# Patient Record
Sex: FEMALE | Employment: OTHER | URBAN - METROPOLITAN AREA
[De-identification: names, ages, dates, MRNs, and addresses within clinical notes are randomized per-mention and may not be internally consistent; named-entity substitution may affect disease eponyms.]

---

## 2017-03-15 ENCOUNTER — OFFICE VISIT (OUTPATIENT)
Dept: NEPHROLOGY | Facility: MEDICAL CENTER | Age: 72
End: 2017-03-15
Payer: MEDICARE

## 2017-03-15 VITALS
BODY MASS INDEX: 24.24 KG/M2 | HEIGHT: 64 IN | SYSTOLIC BLOOD PRESSURE: 125 MMHG | WEIGHT: 142 LBS | DIASTOLIC BLOOD PRESSURE: 55 MMHG

## 2017-03-15 DIAGNOSIS — N18.9 CKD (CHRONIC KIDNEY DISEASE), UNSPECIFIED STAGE: ICD-10-CM

## 2017-03-15 DIAGNOSIS — M54.9 BACK PAIN, UNSPECIFIED BACK LOCATION, UNSPECIFIED BACK PAIN LATERALITY, UNSPECIFIED CHRONICITY: ICD-10-CM

## 2017-03-15 PROCEDURE — 99214 OFFICE O/P EST MOD 30 MIN: CPT | Performed by: INTERNAL MEDICINE

## 2017-03-15 PROCEDURE — 3017F COLORECTAL CA SCREEN DOC REV: CPT | Performed by: INTERNAL MEDICINE

## 2017-03-15 PROCEDURE — 4040F PNEUMOC VAC/ADMIN/RCVD: CPT | Mod: 8P | Performed by: INTERNAL MEDICINE

## 2017-03-15 PROCEDURE — G8484 FLU IMMUNIZE NO ADMIN: HCPCS | Performed by: INTERNAL MEDICINE

## 2017-03-15 PROCEDURE — 3014F SCREEN MAMMO DOC REV: CPT | Performed by: INTERNAL MEDICINE

## 2017-03-15 PROCEDURE — 1036F TOBACCO NON-USER: CPT | Performed by: INTERNAL MEDICINE

## 2017-03-15 PROCEDURE — 1101F PT FALLS ASSESS-DOCD LE1/YR: CPT | Performed by: INTERNAL MEDICINE

## 2017-03-15 PROCEDURE — G8420 CALC BMI NORM PARAMETERS: HCPCS | Performed by: INTERNAL MEDICINE

## 2017-03-15 PROCEDURE — G8432 DEP SCR NOT DOC, RNG: HCPCS | Performed by: INTERNAL MEDICINE

## 2017-03-15 ASSESSMENT — ENCOUNTER SYMPTOMS
NAUSEA: 0
CHILLS: 0
BACK PAIN: 1
FEVER: 0
VISUAL CHANGE: 0
VOMITING: 0

## 2017-03-15 NOTE — MR AVS SNAPSHOT
"        Aura Ospina   3/15/2017 2:45 PM   Office Visit   MRN: [id number]    Department:  [facility]   Dept Phone:  [phone]    Description:  Female : 1945   Provider:  Fadi Najjar, M.D.           Reason for Visit     Follow-Up           Allergies as of 3/15/2017     Allergen Noted Reactions    Penicillins 10/15/2015   Hives    Food 2016       Dairy Products    Sulfa Drugs 2016         You were diagnosed with     CKD (chronic kidney disease), unspecified stage   [[id number]]         Vital Signs     Blood Pressure Height Weight Body Mass Index Smoking Status       125/55 mmHg 1.626 m (5' 4\") 64.411 kg (142 lb) 24.36 kg/m2 Former Smoker       Basic Information     Date Of Birth Sex Race Ethnicity Preferred Language    1945 Female Unknown Unknown English      Problem List              ICD-10-CM Priority Class Noted - Resolved    Back pain M54.9   Unknown - Present    HLD (hyperlipidemia) E78.5   10/15/2015 - Present    GERD without esophagitis K21.9   10/15/2015 - Present    CKD (chronic kidney disease) N18.9   2016 - Present      Health Maintenance        Date Due Completion Dates    IMM DTaP/Tdap/Td Vaccine (1 - Tdap) 1963 ---    PAP SMEAR 1965 ---    IMM ZOSTER VACCINE 2004 ---    IMM PNEUMOCOCCAL 65+ (ADULT) LOW/MEDIUM RISK SERIES (1 of 2 - PCV13) 2009 ---    IMM INFLUENZA (1) 2016 10/15/2015    MAMMOGRAM 2017, 2016, 2016, 2016, 2015    BONE DENSITY 10/26/2020 10/26/2015, 2009 (N/S)    Override on 2009: (N/S)    COLONOSCOPY 10/18/2022 10/18/2012 (Done)    Override on 10/18/2012: Done            Current Immunizations     Influenza Vaccine Adult HD  Incomplete, 10/15/2015      Below and/or attached are the medications your provider expects you to take. Review all of your home medications and newly ordered medications with your provider and/or pharmacist. Follow medication instructions as directed by your provider " and/or pharmacist. Please keep your medication list with you and share with your provider. Update the information when medications are discontinued, doses are changed, or new medications (including over-the-counter products) are added; and carry medication information at all times in the event of emergency situations     Allergies:  PENICILLINS - Hives     FOOD - (reactions not documented)     SULFA DRUGS - (reactions not documented)               Medications  Valid as of: March 15, 2017 -  2:59 PM    Generic Name Brand Name Tablet Size Instructions for use    Acetaminophen   Take  by mouth.        Ascorbic Acid (Tab) ascorbic acid 500 MG Take 500 mg by mouth every day.        Aspirin (Chew Tab) ASA 81 MG Take 81 mg by mouth every day.        Cholecalciferol   Take  by mouth.        Ginkgo Biloba (Tab) Ginkgo Biloba 40 MG Take  by mouth.        Ibuprofen   Take  by mouth.        Iron Combinations (Tab) Iron  Take  by mouth.        Lecithin   Take  by mouth.        Multiple Vitamin (Tab) THERAGRAN  Take 1 Tab by mouth every day.        Omega-3 Fatty Acids (Cap) fish oil 1000 MG Take 1,000 mg by mouth 3 times a day, with meals.        RABEprazole Sodium (Tablet Delayed Response) ACIPHEX 20 MG Take 1 Tab by mouth every day.        Simvastatin (Tab) ZOCOR 20 MG Take 1 Tab by mouth every evening.        .                 Medicines prescribed today were sent to:     [facility] [address] [place] [address] AT [facility]    [address] [place] [id number]    Phone: [phone] Fax: [phone]    Open 24 Hours?: No      Medication refill instructions:       If your prescription bottle indicates you have medication refills left, it is not necessary to call your provider’s office. Please contact your pharmacy and they will refill your medication.    If your prescription bottle indicates you do not have any refills left, you may request refills at any time through one of the  following ways: The online [employer] system (except Urgent Care), by calling your provider’s office, or by asking your pharmacy to contact your provider’s office with a refill request. Medication refills are processed only during regular business hours and may not be available until the next business day. Your provider may request additional information or to have a follow-up visit with you prior to refilling your medication.   *Please Note: Medication refills are assigned a new Rx number when refilled electronically. Your pharmacy may indicate that no refills were authorized even though a new prescription for the same medication is available at the pharmacy. Please request the medicine by name with the pharmacy before contacting your provider for a refill.        Your To Do List     Future Labs/Procedures Complete By Expires    BASIC METABOLIC PANEL  As directed 3/15/2018    CBC WITH DIFFERENTIAL  As directed 3/15/2018         [employer] Access Code: [email or web address]  Expires: 4/14/2017  2:59 PM    [employer]  A secure, online tool to manage your health information     [employer]’s [employer]® is a secure, online tool that connects you to your personalized health information from the privacy of your home -- day or night - making it very easy for you to manage your healthcare. Once the activation process is completed, you can even access your medical information using the [employer] radha, which is available for free in the Apple Radha store or Google Play store.     [employer] provides the following levels of access (as shown below):   My Chart Features   [facility] Doctor [facility]  Urgent  Care Non-[facility]  Primary Care  Doctor   Email your healthcare team securely and privately 24/7 X X X    Manage appointments: schedule your next appointment; view details of past/upcoming appointments X      Request prescription refills. X      View recent personal medical records, including lab and immunizations X X X  X   View health record, including health history, allergies, medications X X X X   Read reports about your outpatient visits, procedures, consult and ER notes X X X X   See your discharge summary, which is a recap of your hospital and/or ER visit that includes your diagnosis, lab results, and care plan. X X       How to register for [employer]:  1. Go to  [email or web address].  2. Click on the Sign Up Now box, which takes you to the New Member Sign Up page. You will need to provide the following information:  a. Enter your [employer] Access Code exactly as it appears at the top of this page. (You will not need to use this code after you’ve completed the sign-up process. If you do not sign up before the expiration date, you must request a new code.)   b. Enter your date of birth.   c. Enter your home email address.   d. Click Submit, and follow the next screen’s instructions.  3. Create a [employer] ID. This will be your [employer] login ID and cannot be changed, so think of one that is secure and easy to remember.  4. Create a [employer] password. You can change your password at any time.  5. Enter your Password Reset Question and Answer. This can be used at a later time if you forget your password.   6. Enter your e-mail address. This allows you to receive e-mail notifications when new information is available in [employer].  7. Click Sign Up. You can now view your health information.    For assistance activating your [employer] account, call [phone]

## 2017-03-15 NOTE — PROGRESS NOTES
"Subjective:      Aura Ospina is a 71 y.o. female who presents with Chronic Kidney Disease    The patient has chronic back pain and she was using NSAIDs for it, she decreased her use of NSAIDs for the last 6 months, since she's been using it on a once a week basis.        Chronic Kidney Disease  This is a chronic problem. The current episode started more than 1 year ago. The problem occurs constantly. The problem has been unchanged. Pertinent negatives include no chills, fever, nausea, urinary symptoms, visual change or vomiting. Exacerbated by: NSAIDs.       Review of Systems   Constitutional: Negative for fever and chills.   Cardiovascular: Negative for leg swelling.   Gastrointestinal: Negative for nausea and vomiting.   Genitourinary: Negative for dysuria, urgency and frequency.   Musculoskeletal: Positive for back pain.   All other systems reviewed and are negative.         Objective:     /55 mmHg  Ht 1.626 m (5' 4\")  Wt 64.411 kg (142 lb)  BMI 24.36 kg/m2     Physical Exam   Constitutional: She is oriented to person, place, and time. She appears well-developed and well-nourished. No distress.   HENT:   Head: Normocephalic and atraumatic.   Nose: Nose normal.   Eyes: Right eye exhibits no discharge. Left eye exhibits no discharge. No scleral icterus.   Cardiovascular: Normal rate and regular rhythm.    Pulmonary/Chest: Effort normal.   Musculoskeletal: She exhibits no edema.   Neurological: She is alert and oriented to person, place, and time.   Skin: Skin is warm and dry. She is not diaphoretic. No erythema.   Psychiatric: She has a normal mood and affect.   Nursing note and vitals reviewed.              Assessment/Plan:     1. CKD (chronic kidney disease), unspecified stage  Continue to avoid NSAIDs if possible   Check labs  Renal dose all medication  Avoid nephrotoxins        "

## 2017-03-16 ENCOUNTER — HOSPITAL ENCOUNTER (OUTPATIENT)
Dept: LAB | Facility: MEDICAL CENTER | Age: 72
End: 2017-03-16
Attending: FAMILY MEDICINE
Payer: MEDICARE

## 2017-03-16 DIAGNOSIS — N18.9 CKD (CHRONIC KIDNEY DISEASE), UNSPECIFIED STAGE: ICD-10-CM

## 2017-03-16 LAB
ANION GAP SERPL CALC-SCNC: 10 MMOL/L (ref 0–11.9)
BASOPHILS # BLD AUTO: 0.07 K/UL (ref 0–0.12)
BASOPHILS NFR BLD AUTO: 0.8 % (ref 0–1.8)
BUN SERPL-MCNC: 20 MG/DL (ref 8–22)
CALCIUM SERPL-MCNC: 10 MG/DL (ref 8.5–10.5)
CHLORIDE SERPL-SCNC: 103 MMOL/L (ref 96–112)
CO2 SERPL-SCNC: 29 MMOL/L (ref 20–33)
CREAT SERPL-MCNC: 0.93 MG/DL (ref 0.5–1.4)
CREAT UR-MCNC: 116.5 MG/DL
EOSINOPHIL # BLD: 0.36 K/UL (ref 0–0.51)
EOSINOPHIL NFR BLD AUTO: 4.3 % (ref 0–6.9)
ERYTHROCYTE [DISTWIDTH] IN BLOOD BY AUTOMATED COUNT: 44.7 FL (ref 35.9–50)
GLUCOSE SERPL-MCNC: 71 MG/DL (ref 65–99)
HCT VFR BLD AUTO: 42 % (ref 37–47)
HGB BLD-MCNC: 13.3 G/DL (ref 12–16)
IMM GRANULOCYTES # BLD AUTO: 0.04 K/UL (ref 0–0.11)
IMM GRANULOCYTES NFR BLD AUTO: 0.5 % (ref 0–0.9)
LYMPHOCYTES # BLD: 2.42 K/UL (ref 1–4.8)
LYMPHOCYTES NFR BLD AUTO: 28.6 % (ref 22–41)
MCH RBC QN AUTO: 30.9 PG (ref 27–33)
MCHC RBC AUTO-ENTMCNC: 31.7 G/DL (ref 33.6–35)
MCV RBC AUTO: 97.4 FL (ref 81.4–97.8)
MICROALBUMIN UR-MCNC: <0.7 MG/DL
MICROALBUMIN/CREAT UR: NORMAL MG/G (ref 0–30)
MONOCYTES # BLD: 0.5 K/UL (ref 0–0.85)
MONOCYTES NFR BLD AUTO: 5.9 % (ref 0–13.4)
NEUTROPHILS # BLD: 5.07 K/UL (ref 2–7.15)
NEUTROPHILS NFR BLD AUTO: 59.9 % (ref 44–72)
NRBC # BLD AUTO: 0.02 K/UL
NRBC BLD-RTO: 0.2 /100 WBC
PLATELET # BLD AUTO: 218 K/UL (ref 164–446)
PMV BLD AUTO: 12.1 FL (ref 9–12.9)
POTASSIUM SERPL-SCNC: 3.8 MMOL/L (ref 3.6–5.5)
RBC # BLD AUTO: 4.31 M/UL (ref 4.2–5.4)
SODIUM SERPL-SCNC: 142 MMOL/L (ref 135–145)
WBC # BLD AUTO: 8.5 K/UL (ref 4.8–10.8)

## 2017-03-16 PROCEDURE — 82043 UR ALBUMIN QUANTITATIVE: CPT

## 2017-03-16 PROCEDURE — 80048 BASIC METABOLIC PNL TOTAL CA: CPT

## 2017-03-16 PROCEDURE — 36415 COLL VENOUS BLD VENIPUNCTURE: CPT

## 2017-03-16 PROCEDURE — 82570 ASSAY OF URINE CREATININE: CPT

## 2017-03-16 PROCEDURE — 85025 COMPLETE CBC W/AUTO DIFF WBC: CPT

## 2017-06-30 NOTE — TELEPHONE ENCOUNTER
Was the patient seen in the last year in this department? Yes     Does patient have an active prescription for medications requested? Yes     Received Request Via: Pharmacy

## 2017-07-05 DIAGNOSIS — K21.9 GERD WITHOUT ESOPHAGITIS: ICD-10-CM

## 2017-07-05 RX ORDER — RABEPRAZOLE SODIUM 20 MG/1
20 TABLET, DELAYED RELEASE ORAL
Qty: 30 TAB | Refills: 2 | Status: SHIPPED | OUTPATIENT
Start: 2017-07-05 | End: 2017-08-02 | Stop reason: SDUPTHER

## 2017-07-05 RX ORDER — RABEPRAZOLE SODIUM 20 MG/1
TABLET, DELAYED RELEASE ORAL
Qty: 30 TAB | Refills: 0 | Status: SHIPPED | OUTPATIENT
Start: 2017-07-05 | End: 2017-07-05 | Stop reason: SDUPTHER

## 2017-07-05 RX ORDER — SIMVASTATIN 20 MG
20 TABLET ORAL EVERY EVENING
Qty: 30 TAB | Refills: 9 | Status: SHIPPED | OUTPATIENT
Start: 2017-07-05 | End: 2018-04-30 | Stop reason: SDUPTHER

## 2017-07-05 NOTE — TELEPHONE ENCOUNTER
Was the patient seen in the last year in this department? Yes     Does patient have an active prescription for medications requested? No     Received Request Via: Patient   Last seen 9/6/16  Last labs 3/16/17

## 2017-08-02 DIAGNOSIS — E78.2 MIXED HYPERLIPIDEMIA: ICD-10-CM

## 2017-08-02 RX ORDER — RABEPRAZOLE SODIUM 20 MG/1
20 TABLET, DELAYED RELEASE ORAL
Qty: 30 TAB | Refills: 0 | Status: SHIPPED | OUTPATIENT
Start: 2017-08-02 | End: 2017-09-12 | Stop reason: SDUPTHER

## 2017-08-02 NOTE — TELEPHONE ENCOUNTER
Was the patient seen in the last year in this department? Yes     Does patient have an active prescription for medications requested? No     Received Request Via: Patient   Last seen 9/6/16  Last labs 3/16/17  Patient has appointment on 9/12/17

## 2017-09-05 ENCOUNTER — HOSPITAL ENCOUNTER (OUTPATIENT)
Dept: LAB | Facility: MEDICAL CENTER | Age: 72
End: 2017-09-05
Attending: FAMILY MEDICINE
Payer: MEDICARE

## 2017-09-05 ENCOUNTER — TELEPHONE (OUTPATIENT)
Dept: MEDICAL GROUP | Facility: PHYSICIAN GROUP | Age: 72
End: 2017-09-05

## 2017-09-05 DIAGNOSIS — E78.2 MIXED HYPERLIPIDEMIA: ICD-10-CM

## 2017-09-05 LAB
25(OH)D3 SERPL-MCNC: 72 NG/ML (ref 30–100)
ALBUMIN SERPL BCP-MCNC: 4.4 G/DL (ref 3.2–4.9)
ALBUMIN/GLOB SERPL: 1.6 G/DL
ALP SERPL-CCNC: 65 U/L (ref 30–99)
ALT SERPL-CCNC: 24 U/L (ref 2–50)
ANION GAP SERPL CALC-SCNC: 9 MMOL/L (ref 0–11.9)
AST SERPL-CCNC: 24 U/L (ref 12–45)
BILIRUB SERPL-MCNC: 0.6 MG/DL (ref 0.1–1.5)
BUN SERPL-MCNC: 17 MG/DL (ref 8–22)
CALCIUM SERPL-MCNC: 9.8 MG/DL (ref 8.5–10.5)
CHLORIDE SERPL-SCNC: 104 MMOL/L (ref 96–112)
CHOLEST SERPL-MCNC: 149 MG/DL (ref 100–199)
CO2 SERPL-SCNC: 26 MMOL/L (ref 20–33)
CREAT SERPL-MCNC: 0.9 MG/DL (ref 0.5–1.4)
ERYTHROCYTE [DISTWIDTH] IN BLOOD BY AUTOMATED COUNT: 44.9 FL (ref 35.9–50)
EST. AVERAGE GLUCOSE BLD GHB EST-MCNC: 131 MG/DL
GFR SERPL CREATININE-BSD FRML MDRD: >60 ML/MIN/1.73 M 2
GLOBULIN SER CALC-MCNC: 2.8 G/DL (ref 1.9–3.5)
GLUCOSE SERPL-MCNC: 66 MG/DL (ref 65–99)
HBA1C MFR BLD: 6.2 % (ref 0–5.6)
HCT VFR BLD AUTO: 41.6 % (ref 37–47)
HDLC SERPL-MCNC: 52 MG/DL
HGB BLD-MCNC: 13.1 G/DL (ref 12–16)
LDLC SERPL CALC-MCNC: 78 MG/DL
MCH RBC QN AUTO: 30.8 PG (ref 27–33)
MCHC RBC AUTO-ENTMCNC: 31.5 G/DL (ref 33.6–35)
MCV RBC AUTO: 97.9 FL (ref 81.4–97.8)
PLATELET # BLD AUTO: 203 K/UL (ref 164–446)
PMV BLD AUTO: 12.1 FL (ref 9–12.9)
POTASSIUM SERPL-SCNC: 3.8 MMOL/L (ref 3.6–5.5)
PROT SERPL-MCNC: 7.2 G/DL (ref 6–8.2)
RBC # BLD AUTO: 4.25 M/UL (ref 4.2–5.4)
SODIUM SERPL-SCNC: 139 MMOL/L (ref 135–145)
T3 SERPL-MCNC: 74.7 NG/DL (ref 60–181)
T4 FREE SERPL-MCNC: 0.78 NG/DL (ref 0.53–1.43)
TRIGL SERPL-MCNC: 96 MG/DL (ref 0–149)
TSH SERPL DL<=0.005 MIU/L-ACNC: 1.02 UIU/ML (ref 0.3–3.7)
WBC # BLD AUTO: 6.8 K/UL (ref 4.8–10.8)

## 2017-09-05 PROCEDURE — 84439 ASSAY OF FREE THYROXINE: CPT

## 2017-09-05 PROCEDURE — 80053 COMPREHEN METABOLIC PANEL: CPT

## 2017-09-05 PROCEDURE — 84443 ASSAY THYROID STIM HORMONE: CPT

## 2017-09-05 PROCEDURE — 36415 COLL VENOUS BLD VENIPUNCTURE: CPT

## 2017-09-05 PROCEDURE — 84480 ASSAY TRIIODOTHYRONINE (T3): CPT

## 2017-09-05 PROCEDURE — 83036 HEMOGLOBIN GLYCOSYLATED A1C: CPT

## 2017-09-05 PROCEDURE — 82306 VITAMIN D 25 HYDROXY: CPT

## 2017-09-05 PROCEDURE — 85027 COMPLETE CBC AUTOMATED: CPT

## 2017-09-05 PROCEDURE — 80061 LIPID PANEL: CPT

## 2017-09-12 ENCOUNTER — OFFICE VISIT (OUTPATIENT)
Dept: MEDICAL GROUP | Facility: PHYSICIAN GROUP | Age: 72
End: 2017-09-12
Payer: MEDICARE

## 2017-09-12 VITALS
RESPIRATION RATE: 16 BRPM | DIASTOLIC BLOOD PRESSURE: 62 MMHG | HEART RATE: 66 BPM | BODY MASS INDEX: 22.71 KG/M2 | OXYGEN SATURATION: 97 % | SYSTOLIC BLOOD PRESSURE: 132 MMHG | HEIGHT: 64 IN | WEIGHT: 133 LBS | TEMPERATURE: 98 F

## 2017-09-12 DIAGNOSIS — E78.2 MIXED HYPERLIPIDEMIA: ICD-10-CM

## 2017-09-12 DIAGNOSIS — Z12.31 ENCOUNTER FOR SCREENING MAMMOGRAM FOR HIGH-RISK PATIENT: ICD-10-CM

## 2017-09-12 DIAGNOSIS — Z23 NEED FOR PNEUMOCOCCAL VACCINATION: ICD-10-CM

## 2017-09-12 DIAGNOSIS — K21.9 GERD WITHOUT ESOPHAGITIS: ICD-10-CM

## 2017-09-12 DIAGNOSIS — Z23 NEED FOR TDAP VACCINATION: ICD-10-CM

## 2017-09-12 DIAGNOSIS — Z23 NEED FOR INFLUENZA VACCINATION: ICD-10-CM

## 2017-09-12 DIAGNOSIS — N18.2 CKD (CHRONIC KIDNEY DISEASE), STAGE 2 (MILD): ICD-10-CM

## 2017-09-12 DIAGNOSIS — Z00.00 MEDICARE ANNUAL WELLNESS VISIT, SUBSEQUENT: ICD-10-CM

## 2017-09-12 PROCEDURE — G0009 ADMIN PNEUMOCOCCAL VACCINE: HCPCS | Performed by: FAMILY MEDICINE

## 2017-09-12 PROCEDURE — 90670 PCV13 VACCINE IM: CPT | Performed by: FAMILY MEDICINE

## 2017-09-12 PROCEDURE — 90662 IIV NO PRSV INCREASED AG IM: CPT | Performed by: FAMILY MEDICINE

## 2017-09-12 PROCEDURE — G0439 PPPS, SUBSEQ VISIT: HCPCS | Mod: 25 | Performed by: FAMILY MEDICINE

## 2017-09-12 PROCEDURE — 90715 TDAP VACCINE 7 YRS/> IM: CPT | Performed by: FAMILY MEDICINE

## 2017-09-12 PROCEDURE — G0008 ADMIN INFLUENZA VIRUS VAC: HCPCS | Performed by: FAMILY MEDICINE

## 2017-09-12 PROCEDURE — 90472 IMMUNIZATION ADMIN EACH ADD: CPT | Performed by: FAMILY MEDICINE

## 2017-09-12 RX ORDER — RABEPRAZOLE SODIUM 20 MG/1
20 TABLET, DELAYED RELEASE ORAL
Qty: 30 TAB | Refills: 11 | Status: SHIPPED | OUTPATIENT
Start: 2017-09-12 | End: 2018-08-27 | Stop reason: SDUPTHER

## 2017-09-12 RX ORDER — RABEPRAZOLE SODIUM 20 MG/1
20 TABLET, DELAYED RELEASE ORAL
Qty: 30 TAB | Refills: 6 | Status: CANCELLED | OUTPATIENT
Start: 2017-09-12

## 2017-09-12 ASSESSMENT — PATIENT HEALTH QUESTIONNAIRE - PHQ9: CLINICAL INTERPRETATION OF PHQ2 SCORE: 0

## 2017-09-12 NOTE — PROGRESS NOTES
Chief Complaint   Patient presents with   • Gastrophageal Reflux         HPI:  Aura is a 72 y.o. here for Medicare Annual Wellness Visit        Patient Active Problem List    Diagnosis Date Noted   • CKD (chronic kidney disease) 09/20/2016   • HLD (hyperlipidemia) 10/15/2015   • GERD without esophagitis 10/15/2015   • Back pain        Current Outpatient Prescriptions   Medication Sig Dispense Refill   • rabeprazole (ACIPHEX) 20 MG tablet Take 1 Tab by mouth every day. 30 Tab 0   • simvastatin (ZOCOR) 20 MG Tab Take 1 Tab by mouth every evening. 30 Tab 9   • multivitamin (THERAGRAN) Tab Take 1 Tab by mouth every day.     • ascorbic acid (ASCORBIC ACID) 500 MG Tab Take 500 mg by mouth every day.     • Cholecalciferol (VITAMIN D-3 PO) Take  by mouth.     • Iron Tab Take  by mouth.     • Omega-3 Fatty Acids (FISH OIL) 1000 MG Cap capsule Take 1,000 mg by mouth 3 times a day, with meals.     • Ginkgo Biloba (GINKOBA) 40 MG Tab Take  by mouth.     • LECITHIN PO Take  by mouth.     • aspirin (ASA) 81 MG Chew Tab chewable tablet Take 81 mg by mouth every day.     • Acetaminophen (TYLENOL 8 HOUR PO) Take  by mouth.     • Ibuprofen (MOTRIN IB PO) Take  by mouth.     • rabeprazole (ACIPHEX) 20 MG tablet Take 1 Tab by mouth every day. (Patient not taking: Reported on 9/12/2017) 30 Tab 9     No current facility-administered medications for this visit.         The patient is not taking no medication(s) due to:  no prescription for the medication.  Current supplements as per medication list.     Allergies: Penicillins; Food; and Sulfa drugs    Current social contact/activities:  Bowling , golfing and traveling    Is patient current with immunizations? No, due for TDAP. Patient is interested in receiving PREVNAR (PCV13)  today.    She  reports that she has quit smoking. She has never used smokeless tobacco. She reports that she does not drink alcohol or use drugs.  Counseling given: Not Answered        DPA/Advanced directive:  Patient does not have an Advanced Directive.  A packet and workshop information was given on Advanced Directives.    ROS:    Gait: Uses no assistive device no  Ostomy: no no  Other tubes: no no  Amputations: no no  Chronic oxygen use no no  Last eye exam  9/1/16  Wears hearing aids: no   : Denies incontinence. no      Screening:        Depression Screening    Little interest or pleasure in doing things?  0 - not at all  Feeling down, depressed, or hopeless? 0 - not at all  Patient Health Questionnaire Score: 0    If depressive symptoms identified deferred to follow up visit unless specifically addressed in assessment and plan.    Interpretation of PHQ-9 Total Score   Score Severity   1-4 No Depression   5-9 Mild Depression   10-14 Moderate Depression   15-19 Moderately Severe Depression   20-27 Severe Depression    Screening for Cognitive Impairment    Three Minute Recall (apple, watch, ang)   3/3    Draw clock face with all 12 numbers set to the hand to show 10 minutes past 11 o'clock       If cognitive concerns identified, deferred for follow up unless specifically addressed in assessment and plan.    Fall Risk Assessment    Has the patient had two or more falls in the last year or any fall with injury in the last year?  No  If fall risk identified, deferred for follow up unless specifically addressed in assessment and plan.    Safety Assessment    Throw rugs on floor.   yes  Handrails on all stairs.   No stairs  Good lighting in all hallways.   yes  Difficulty hearing.   no  Patient counseled about all safety risks that were identified.    Functional Assessment ADLs    Are there any barriers preventing you from cooking for yourself or meeting nutritional needs?   .  no  Are there any barriers preventing you from driving safely or obtaining transportation?   .  no  Are there any barriers preventing you from using a telephone or calling for help?   .  no  Are there any barriers preventing you from shopping?   .  "no   Are there any barriers preventing you from taking care of your own finances?   .  no  Are there any barriers preventing you from managing your medications?   .  no  Are you currently engaging any exercise or physical activity?   .   yes    Health Maintenance Summary                IMM DTaP/Tdap/Td Vaccine Overdue 6/4/1963     PAP SMEAR Overdue 6/4/1965     IMM ZOSTER VACCINE Overdue 6/4/2004     IMM PNEUMOCOCCAL 65+ (ADULT) LOW/MEDIUM RISK SERIES Overdue 6/4/2009     MAMMOGRAM Overdue 12/18/2016      Done 12/18/2015 MA-SCREEN MAMMO W/CAD-BILAT    IMM INFLUENZA Overdue 9/1/2017      Done 10/15/2015 Imm Admin: Influenza Vaccine Adult HD    BONE DENSITY Next Due 10/26/2020      Done 10/26/2015 DS-BONE DENSITY STUDY (DEXA)     Patient has more history with this topic...    COLONOSCOPY Next Due 10/18/2022      Done 10/18/2012           Patient Care Team:  Slava Worthy M.D. as PCP - General (Family Medicine)    Social History   Substance Use Topics   • Smoking status: Former Smoker   • Smokeless tobacco: Never Used   • Alcohol use No     Family History   Problem Relation Age of Onset   • Lung Disease Mother    • Diabetes Mother    • Cancer Mother    • Heart Disease Father      She  has a past medical history of Back pain; GERD (gastroesophageal reflux disease); and Hyperlipidemia.   No past surgical history on file.        Exam:     Blood pressure 132/62, pulse 66, temperature 36.7 °C (98 °F), resp. rate 16, height 1.626 m (5' 4\"), weight 60.3 kg (133 lb), SpO2 97 %. Body mass index is 22.83 kg/m².    Hearing excellent.    Dentition good  Alert, oriented in no acute distress.  Eye contact is good, speech goal directed, affect calm  yes    Assessment and Plan. The following treatment and monitoring plan is recommended:    1. Medicare annual wellness visit, subsequent      2. Need for Tdap vaccination    - Tdap =>8yo IM    3. Need for influenza vaccination    - INFLUENZA VACCINE, HIGH DOSE (65+ ONLY)    4. Encounter " for screening mammogram for high-risk patient    - [place]-SCREEN MAMMO W/CAD-BILAT    5. CKD (chronic kidney disease), stage 2 (mild)  Patient is currently being followed for chronic renal insufficiency. They are avoiding nsaids and maintaining hydration and following renal diet. Taking all appropriate meds. No new change in urine frequency or volume.      6. Mixed hyperlipidemia  Currently treated for HLD, taking meds with no new myalgias or joint pain, watching fats in diet  controlled    At target    7. GERD without esophagitis  Patient is currently being treated for gerd, taking meds with no new symptoms or side effects, is trying to modify diet with decreased acidic foods, caffeine, etoh.  controlled        8. Need for pneumococcal vaccination    - pneumococcal 13-Vidya Conj Vacc (PREVNAR 13) syringe; 0.5 mL by Intramuscular route Once PRN for up to 1 dose.  Dispense: 0.5 Each; Refill: 0    Past Medical History:   Diagnosis Date   • Back pain     L4-5 disc that occasionally hurts   • GERD (gastroesophageal reflux disease)    • Hyperlipidemia      No past surgical history on file.  Social History   Substance Use Topics   • Smoking status: Former Smoker   • Smokeless tobacco: Never Used   • Alcohol use No     Family History   Problem Relation Age of Onset   • Lung Disease Mother    • Diabetes Mother    • Cancer Mother    • Heart Disease Father          Current Outpatient Prescriptions:   •  pneumococcal 13-Vidya Conj Vacc (PREVNAR 13) syringe, 0.5 mL by Intramuscular route Once PRN for up to 1 dose., Disp: 0.5 Each, Rfl: 0  •  rabeprazole (ACIPHEX) 20 MG tablet, Take 1 Tab by mouth every day., Disp: 30 Tab, Rfl: 0  •  simvastatin (ZOCOR) 20 MG Tab, Take 1 Tab by mouth every evening., Disp: 30 Tab, Rfl: 9  •  multivitamin (THERAGRAN) Tab, Take 1 Tab by mouth every day., Disp: , Rfl:   •  ascorbic acid (ASCORBIC ACID) 500 MG Tab, Take 500 mg by mouth every day., Disp: , Rfl:   •  Cholecalciferol (VITAMIN D-3 PO), Take  by  mouth., Disp: , Rfl:   •  Iron Tab, Take  by mouth., Disp: , Rfl:   •  Omega-3 Fatty Acids (FISH OIL) 1000 MG Cap capsule, Take 1,000 mg by mouth 3 times a day, with meals., Disp: , Rfl:   •  Ginkgo Biloba (GINKOBA) 40 MG Tab, Take  by mouth., Disp: , Rfl:   •  LECITHIN PO, Take  by mouth., Disp: , Rfl:   •  aspirin (ASA) 81 MG Chew Tab chewable tablet, Take 81 mg by mouth every day., Disp: , Rfl:   •  Acetaminophen (TYLENOL 8 HOUR PO), Take  by mouth., Disp: , Rfl:   •  Ibuprofen (MOTRIN IB PO), Take  by mouth., Disp: , Rfl:   •  rabeprazole (ACIPHEX) 20 MG tablet, Take 1 Tab by mouth every day. (Patient not taking: Reported on 9/12/2017), Disp: 30 Tab, Rfl: 9        Services suggested: No services needed at this time  Health Care Screening recommendations as per orders if indicated.  Referrals offered: PT/OT/Nutrition counseling/Behavioral Health/Smoking cessation as per orders if indicated.    Discussion today about general wellness and lifestyle habits:    · Prevent falls and reduce trip hazards; Cautioned about securing or removing rugs.  · Have a working fire alarm and carbon monoxide detector;   · Engage in regular physical activity and social activities       Follow-up: No Follow-up on file.

## 2018-03-13 ENCOUNTER — HOSPITAL ENCOUNTER (OUTPATIENT)
Dept: LAB | Facility: MEDICAL CENTER | Age: 73
End: 2018-03-13
Attending: INTERNAL MEDICINE
Payer: MEDICARE

## 2018-03-13 DIAGNOSIS — N18.30 STAGE 3 CHRONIC KIDNEY DISEASE (HCC): ICD-10-CM

## 2018-03-13 DIAGNOSIS — M54.9 BACK PAIN: ICD-10-CM

## 2018-03-13 LAB
ANION GAP SERPL CALC-SCNC: 9 MMOL/L (ref 0–11.9)
BASOPHILS # BLD AUTO: 1 % (ref 0–1.8)
BASOPHILS # BLD: 0.08 K/UL (ref 0–0.12)
BUN SERPL-MCNC: 17 MG/DL (ref 8–22)
CALCIUM SERPL-MCNC: 10 MG/DL (ref 8.5–10.5)
CHLORIDE SERPL-SCNC: 103 MMOL/L (ref 96–112)
CO2 SERPL-SCNC: 27 MMOL/L (ref 20–33)
CREAT SERPL-MCNC: 1.01 MG/DL (ref 0.5–1.4)
CREAT UR-MCNC: 55.8 MG/DL
EOSINOPHIL # BLD AUTO: 0.78 K/UL (ref 0–0.51)
EOSINOPHIL NFR BLD: 9.6 % (ref 0–6.9)
ERYTHROCYTE [DISTWIDTH] IN BLOOD BY AUTOMATED COUNT: 43.6 FL (ref 35.9–50)
GLUCOSE SERPL-MCNC: 74 MG/DL (ref 65–99)
HCT VFR BLD AUTO: 41.8 % (ref 37–47)
HGB BLD-MCNC: 13.6 G/DL (ref 12–16)
IMM GRANULOCYTES # BLD AUTO: 0.02 K/UL (ref 0–0.11)
IMM GRANULOCYTES NFR BLD AUTO: 0.2 % (ref 0–0.9)
LYMPHOCYTES # BLD AUTO: 1.69 K/UL (ref 1–4.8)
LYMPHOCYTES NFR BLD: 20.8 % (ref 22–41)
MCH RBC QN AUTO: 31.9 PG (ref 27–33)
MCHC RBC AUTO-ENTMCNC: 32.5 G/DL (ref 33.6–35)
MCV RBC AUTO: 97.9 FL (ref 81.4–97.8)
MICROALBUMIN UR-MCNC: <0.7 MG/DL
MICROALBUMIN/CREAT UR: NORMAL MG/G (ref 0–30)
MONOCYTES # BLD AUTO: 0.67 K/UL (ref 0–0.85)
MONOCYTES NFR BLD AUTO: 8.2 % (ref 0–13.4)
NEUTROPHILS # BLD AUTO: 4.9 K/UL (ref 2–7.15)
NEUTROPHILS NFR BLD: 60.2 % (ref 44–72)
NRBC # BLD AUTO: 0 K/UL
NRBC BLD-RTO: 0 /100 WBC
PLATELET # BLD AUTO: 207 K/UL (ref 164–446)
PMV BLD AUTO: 12.5 FL (ref 9–12.9)
POTASSIUM SERPL-SCNC: 4.3 MMOL/L (ref 3.6–5.5)
RBC # BLD AUTO: 4.27 M/UL (ref 4.2–5.4)
SODIUM SERPL-SCNC: 139 MMOL/L (ref 135–145)
WBC # BLD AUTO: 8.1 K/UL (ref 4.8–10.8)

## 2018-03-13 PROCEDURE — 82570 ASSAY OF URINE CREATININE: CPT

## 2018-03-13 PROCEDURE — 85025 COMPLETE CBC W/AUTO DIFF WBC: CPT

## 2018-03-13 PROCEDURE — 36415 COLL VENOUS BLD VENIPUNCTURE: CPT

## 2018-03-13 PROCEDURE — 80048 BASIC METABOLIC PNL TOTAL CA: CPT

## 2018-03-13 PROCEDURE — 82043 UR ALBUMIN QUANTITATIVE: CPT

## 2018-03-20 ENCOUNTER — OFFICE VISIT (OUTPATIENT)
Dept: NEPHROLOGY | Facility: MEDICAL CENTER | Age: 73
End: 2018-03-20
Payer: MEDICARE

## 2018-03-20 VITALS
DIASTOLIC BLOOD PRESSURE: 78 MMHG | BODY MASS INDEX: 24.59 KG/M2 | OXYGEN SATURATION: 95 % | WEIGHT: 144 LBS | HEART RATE: 73 BPM | RESPIRATION RATE: 14 BRPM | TEMPERATURE: 98.7 F | SYSTOLIC BLOOD PRESSURE: 132 MMHG | HEIGHT: 64 IN

## 2018-03-20 DIAGNOSIS — N18.30 STAGE 3 CHRONIC KIDNEY DISEASE (HCC): ICD-10-CM

## 2018-03-20 PROCEDURE — 99214 OFFICE O/P EST MOD 30 MIN: CPT | Performed by: INTERNAL MEDICINE

## 2018-03-20 RX ORDER — VITAMIN E 268 MG
400 CAPSULE ORAL DAILY
COMMUNITY

## 2018-03-20 ASSESSMENT — ENCOUNTER SYMPTOMS
NAUSEA: 0
COUGH: 0
VOMITING: 0
SHORTNESS OF BREATH: 0

## 2018-03-20 NOTE — PROGRESS NOTES
"Subjective:      Aura Ospina is a 72 y.o. female who presents with Chronic Kidney Disease            Chronic Kidney Disease   This is a chronic problem. The current episode started more than 1 year ago. The problem occurs constantly. The problem has been unchanged. Pertinent negatives include no chest pain, coughing, nausea, urinary symptoms or vomiting. Nothing aggravates the symptoms.       Review of Systems   Respiratory: Negative for cough and shortness of breath.    Cardiovascular: Negative for chest pain and leg swelling.   Gastrointestinal: Negative for nausea and vomiting.   Genitourinary: Negative for dysuria, frequency and urgency.          Objective:     /78   Pulse 73   Temp 37.1 °C (98.7 °F)   Resp 14   Ht 1.626 m (5' 4\")   Wt 65.3 kg (144 lb)   SpO2 95%   BMI 24.72 kg/m²      Physical Exam   Constitutional: She is oriented to person, place, and time. She appears well-developed and well-nourished. No distress.   HENT:   Right Ear: External ear normal.   Left Ear: External ear normal.   Nose: Nose normal.   Eyes: Conjunctivae are normal. Right eye exhibits no discharge. Left eye exhibits no discharge. No scleral icterus.   Cardiovascular: Normal rate and regular rhythm.    Pulmonary/Chest: Effort normal and breath sounds normal. No respiratory distress. She has no wheezes.   Musculoskeletal: She exhibits no edema.   Neurological: She is alert and oriented to person, place, and time.   Skin: Skin is warm. She is not diaphoretic.   Psychiatric: She has a normal mood and affect. Her behavior is normal.   Nursing note and vitals reviewed.              Assessment/Plan:     1. Stage 3 chronic kidney disease  Creatinine stable  No uremic symptoms  Renal dose medication  Avoid nephrotoxins  Check labs annually      "

## 2018-04-30 DIAGNOSIS — K21.9 GERD WITHOUT ESOPHAGITIS: ICD-10-CM

## 2018-04-30 RX ORDER — SIMVASTATIN 20 MG
20 TABLET ORAL EVERY EVENING
Qty: 90 TAB | Refills: 1 | Status: SHIPPED | OUTPATIENT
Start: 2018-04-30 | End: 2018-10-24 | Stop reason: SDUPTHER

## 2018-04-30 NOTE — TELEPHONE ENCOUNTER
Was the patient seen in the last year in this department? Yes     Does patient have an active prescription for medications requested? Yes     Received Request Via: Patient     Last visit 9/12/2017  Last labs 03/13/2018

## 2018-08-27 ENCOUNTER — TELEPHONE (OUTPATIENT)
Dept: MEDICAL GROUP | Facility: PHYSICIAN GROUP | Age: 73
End: 2018-08-27

## 2018-08-27 DIAGNOSIS — E78.2 MIXED HYPERLIPIDEMIA: ICD-10-CM

## 2018-08-27 DIAGNOSIS — N18.1 STAGE 1 CHRONIC KIDNEY DISEASE: ICD-10-CM

## 2018-08-27 RX ORDER — RABEPRAZOLE SODIUM 20 MG/1
20 TABLET, DELAYED RELEASE ORAL
Qty: 90 TAB | Refills: 2 | Status: SHIPPED | OUTPATIENT
Start: 2018-08-27 | End: 2022-04-29 | Stop reason: SDUPTHER

## 2018-09-05 ENCOUNTER — HOSPITAL ENCOUNTER (OUTPATIENT)
Dept: LAB | Facility: MEDICAL CENTER | Age: 73
End: 2018-09-05
Attending: FAMILY MEDICINE
Payer: MEDICARE

## 2018-09-05 DIAGNOSIS — N18.30 STAGE 3 CHRONIC KIDNEY DISEASE (HCC): ICD-10-CM

## 2018-09-05 DIAGNOSIS — E78.2 MIXED HYPERLIPIDEMIA: ICD-10-CM

## 2018-09-05 DIAGNOSIS — N18.1 STAGE 1 CHRONIC KIDNEY DISEASE: ICD-10-CM

## 2018-09-05 LAB
ALBUMIN SERPL BCP-MCNC: 4.7 G/DL (ref 3.2–4.9)
ALBUMIN/GLOB SERPL: 1.7 G/DL
ALP SERPL-CCNC: 63 U/L (ref 30–99)
ALT SERPL-CCNC: 34 U/L (ref 2–50)
ANION GAP SERPL CALC-SCNC: 9 MMOL/L (ref 0–11.9)
AST SERPL-CCNC: 28 U/L (ref 12–45)
BASOPHILS # BLD AUTO: 1 % (ref 0–1.8)
BASOPHILS # BLD: 0.08 K/UL (ref 0–0.12)
BILIRUB SERPL-MCNC: 0.8 MG/DL (ref 0.1–1.5)
BUN SERPL-MCNC: 21 MG/DL (ref 8–22)
CALCIUM SERPL-MCNC: 9.3 MG/DL (ref 8.5–10.5)
CHLORIDE SERPL-SCNC: 106 MMOL/L (ref 96–112)
CHOLEST SERPL-MCNC: 164 MG/DL (ref 100–199)
CO2 SERPL-SCNC: 26 MMOL/L (ref 20–33)
CREAT SERPL-MCNC: 1.09 MG/DL (ref 0.5–1.4)
EOSINOPHIL # BLD AUTO: 0.41 K/UL (ref 0–0.51)
EOSINOPHIL NFR BLD: 5.4 % (ref 0–6.9)
ERYTHROCYTE [DISTWIDTH] IN BLOOD BY AUTOMATED COUNT: 43.5 FL (ref 35.9–50)
GLOBULIN SER CALC-MCNC: 2.7 G/DL (ref 1.9–3.5)
GLUCOSE SERPL-MCNC: 90 MG/DL (ref 65–99)
HCT VFR BLD AUTO: 41.5 % (ref 37–47)
HDLC SERPL-MCNC: 56 MG/DL
HGB BLD-MCNC: 13.2 G/DL (ref 12–16)
IMM GRANULOCYTES # BLD AUTO: 0.02 K/UL (ref 0–0.11)
IMM GRANULOCYTES NFR BLD AUTO: 0.3 % (ref 0–0.9)
LDLC SERPL CALC-MCNC: 80 MG/DL
LYMPHOCYTES # BLD AUTO: 1.7 K/UL (ref 1–4.8)
LYMPHOCYTES NFR BLD: 22.3 % (ref 22–41)
MCH RBC QN AUTO: 30.8 PG (ref 27–33)
MCHC RBC AUTO-ENTMCNC: 31.8 G/DL (ref 33.6–35)
MCV RBC AUTO: 96.7 FL (ref 81.4–97.8)
MONOCYTES # BLD AUTO: 0.57 K/UL (ref 0–0.85)
MONOCYTES NFR BLD AUTO: 7.5 % (ref 0–13.4)
NEUTROPHILS # BLD AUTO: 4.84 K/UL (ref 2–7.15)
NEUTROPHILS NFR BLD: 63.5 % (ref 44–72)
NRBC # BLD AUTO: 0 K/UL
NRBC BLD-RTO: 0 /100 WBC
PLATELET # BLD AUTO: 204 K/UL (ref 164–446)
PMV BLD AUTO: 12.2 FL (ref 9–12.9)
POTASSIUM SERPL-SCNC: 4.3 MMOL/L (ref 3.6–5.5)
PROT SERPL-MCNC: 7.4 G/DL (ref 6–8.2)
RBC # BLD AUTO: 4.29 M/UL (ref 4.2–5.4)
SODIUM SERPL-SCNC: 141 MMOL/L (ref 135–145)
T4 FREE SERPL-MCNC: 0.71 NG/DL (ref 0.53–1.43)
TRIGL SERPL-MCNC: 142 MG/DL (ref 0–149)
WBC # BLD AUTO: 7.6 K/UL (ref 4.8–10.8)

## 2018-09-05 PROCEDURE — 85025 COMPLETE CBC W/AUTO DIFF WBC: CPT

## 2018-09-05 PROCEDURE — 80053 COMPREHEN METABOLIC PANEL: CPT

## 2018-09-05 PROCEDURE — 36415 COLL VENOUS BLD VENIPUNCTURE: CPT

## 2018-09-05 PROCEDURE — 80061 LIPID PANEL: CPT

## 2018-09-05 PROCEDURE — 84439 ASSAY OF FREE THYROXINE: CPT

## 2018-09-13 ENCOUNTER — OFFICE VISIT (OUTPATIENT)
Dept: MEDICAL GROUP | Facility: PHYSICIAN GROUP | Age: 73
End: 2018-09-13
Payer: MEDICARE

## 2018-09-13 VITALS
SYSTOLIC BLOOD PRESSURE: 116 MMHG | HEIGHT: 65 IN | BODY MASS INDEX: 22.66 KG/M2 | HEART RATE: 67 BPM | WEIGHT: 136 LBS | RESPIRATION RATE: 16 BRPM | TEMPERATURE: 98 F | DIASTOLIC BLOOD PRESSURE: 78 MMHG | OXYGEN SATURATION: 96 %

## 2018-09-13 DIAGNOSIS — Z23 NEED FOR VACCINATION: ICD-10-CM

## 2018-09-13 DIAGNOSIS — Z00.00 MEDICARE ANNUAL WELLNESS VISIT, SUBSEQUENT: ICD-10-CM

## 2018-09-13 DIAGNOSIS — M65.30 TRIGGER FINGER OF LEFT HAND, UNSPECIFIED FINGER: ICD-10-CM

## 2018-09-13 DIAGNOSIS — N18.1 STAGE 1 CHRONIC KIDNEY DISEASE: ICD-10-CM

## 2018-09-13 DIAGNOSIS — K21.9 GERD WITHOUT ESOPHAGITIS: ICD-10-CM

## 2018-09-13 DIAGNOSIS — Z12.31 ENCOUNTER FOR SCREENING MAMMOGRAM FOR HIGH-RISK PATIENT: ICD-10-CM

## 2018-09-13 DIAGNOSIS — E78.2 MIXED HYPERLIPIDEMIA: ICD-10-CM

## 2018-09-13 PROCEDURE — G0439 PPPS, SUBSEQ VISIT: HCPCS | Mod: 25 | Performed by: FAMILY MEDICINE

## 2018-09-13 PROCEDURE — G0008 ADMIN INFLUENZA VIRUS VAC: HCPCS | Performed by: FAMILY MEDICINE

## 2018-09-13 PROCEDURE — 90662 IIV NO PRSV INCREASED AG IM: CPT | Performed by: FAMILY MEDICINE

## 2018-09-13 PROCEDURE — G0009 ADMIN PNEUMOCOCCAL VACCINE: HCPCS | Performed by: FAMILY MEDICINE

## 2018-09-13 PROCEDURE — 90732 PPSV23 VACC 2 YRS+ SUBQ/IM: CPT | Performed by: FAMILY MEDICINE

## 2018-09-13 ASSESSMENT — ACTIVITIES OF DAILY LIVING (ADL): BATHING_REQUIRES_ASSISTANCE: 0

## 2018-09-13 ASSESSMENT — ENCOUNTER SYMPTOMS: GENERAL WELL-BEING: EXCELLENT

## 2018-09-13 ASSESSMENT — PATIENT HEALTH QUESTIONNAIRE - PHQ9: CLINICAL INTERPRETATION OF PHQ2 SCORE: 0

## 2018-09-13 NOTE — PROGRESS NOTES
Chief Complaint   Patient presents with   • Annual Wellness Visit         HPI:  Aura is a 73 y.o. here for Medicare Annual Wellness Visit      Patient Active Problem List    Diagnosis Date Noted   • CKD (chronic kidney disease) 09/20/2016   • HLD (hyperlipidemia) 10/15/2015   • GERD without esophagitis 10/15/2015   • Back pain        Current Outpatient Prescriptions   Medication Sig Dispense Refill   • simvastatin (ZOCOR) 20 MG Tab Take 1 Tab by mouth every evening. 90 Tab 1   • vitamin e (VITAMIN E) 400 UNIT Cap Take 400 Units by mouth every day.     • rabeprazole (ACIPHEX) 20 MG tablet Take 1 Tab by mouth every day. 30 Tab 9   • multivitamin (THERAGRAN) Tab Take 1 Tab by mouth every day.     • ascorbic acid (ASCORBIC ACID) 500 MG Tab Take 500 mg by mouth every day.     • Cholecalciferol (VITAMIN D-3 PO) Take  by mouth.     • Iron Tab Take  by mouth.     • Omega-3 Fatty Acids (FISH OIL) 1000 MG Cap capsule Take 1,000 mg by mouth 3 times a day, with meals.     • Ginkgo Biloba (GINKOBA) 40 MG Tab Take  by mouth.     • LECITHIN PO Take  by mouth.     • aspirin (ASA) 81 MG Chew Tab chewable tablet Take 81 mg by mouth every day.     • Acetaminophen (TYLENOL 8 HOUR PO) Take  by mouth.     • Ibuprofen (MOTRIN IB PO) Take  by mouth.     • rabeprazole (ACIPHEX) 20 MG tablet Take 1 Tab by mouth every day. 90 Tab 2     No current facility-administered medications for this visit.         Patient is taking medications as noted in medication list.  Current supplements as per medication list.     Allergies: Penicillins; Food; and Sulfa drugs    Current social contact/activities: goes to [employer], [place], golfs, visits family    Is patient current with immunizations? No, due for FLU and PNEUMOVAX (PPSV23). Patient is interested in receiving FLU and PNEUMOVAX (PPSV23) today.    She  reports that she quit smoking about 51 years ago. Her smoking use included Cigarettes. She has a 2.00 pack-year smoking history. She has never used  smokeless tobacco. She reports that she does not drink alcohol or use drugs.  Counseling given: Not Answered        DPA/Advanced directive: Patient has Advanced Directive, but it is not on file. Instructed to bring in a copy to scan into their chart.    ROS:    Gait: Uses no assistive device   Ostomy: No   Other tubes: No   Amputations: No   Chronic oxygen use No   Last eye exam 2017  Wears hearing aids: No   : Denies any urinary leakage during the last 6 months    Depression Screening    Little interest or pleasure in doing things?  0 - not at all  Feeling down, depressed, or hopeless? 0 - not at all  Patient Health Questionnaire Score: 0    If depressive symptoms identified deferred to follow up visit unless specifically addressed in assessment and plan.    Interpretation of PHQ-9 Total Score   Score Severity   1-4 No Depression   5-9 Mild Depression   10-14 Moderate Depression   15-19 Moderately Severe Depression   20-27 Severe Depression    Screening for Cognitive Impairment    Three Minute Recall (leader, season, table)  3/3    Festus clock face with all 12 numbers and set the hands to show 10 past 11.  Yes 5/5  If cognitive concerns identified, deferred for follow up unless specifically addressed in assessment and plan.    Fall Risk Assessment    Has the patient had two or more falls in the last year or any fall with injury in the last year?  No  If fall risk identified, deferred for follow up unless specifically addressed in assessment and plan.    Safety Assessment    Throw rugs on floor.  Yes  Handrails on all stairs.  Yes  Good lighting in all hallways.  Yes  Difficulty hearing.  No  Patient counseled about all safety risks that were identified.    Functional Assessment ADLs    Are there any barriers preventing you from cooking for yourself or meeting nutritional needs?  No.    Are there any barriers preventing you from driving safely or obtaining transportation?  No.    Are there any barriers preventing  you from using a telephone or calling for help?  No.    Are there any barriers preventing you from shopping?  No.    Are there any barriers preventing you from taking care of your own finances?  No.    Are there any barriers preventing you from managing your medications?  No.    Are there any barriers preventing you from showering, bathing or dressing yourself?  No.    Are you currently engaging in any exercise or physical activity?  Yes.  Plays golf 2-3 times week, bowls 1x week, treadmill, cardio machine, free-weights intermittent weekly  What is your perception of your health?  Excellent.    Health Maintenance Summary                PAP SMEAR Overdue 6/4/1965     IMM ZOSTER VACCINES Overdue 8/23/2012      Done 6/28/2012 Imm Admin: Zoster Vaccine Live (ZVL) (Zostavax)    MAMMOGRAM Overdue 12/18/2016      Done 12/18/2015 MA-SCREEN MAMMO W/CAD-BILAT    IMM INFLUENZA Overdue 9/1/2018      Done 9/12/2017 Imm Admin: Influenza Vaccine Adult HD     Patient has more history with this topic...    IMM PNEUMOCOCCAL 65+ (ADULT) LOW/MEDIUM RISK SERIES Overdue 9/12/2018      Done 9/12/2017 Imm Admin: Pneumococcal Conjugate Vaccine (Prevnar/PCV-13)     Patient has more history with this topic...    Annual Wellness Visit Overdue 9/13/2018      Done 9/12/2017      Patient has more history with this topic...    BONE DENSITY Next Due 10/26/2020      Done 10/26/2015 DS-BONE DENSITY STUDY (DEXA)     Patient has more history with this topic...    COLONOSCOPY Next Due 10/18/2022      Done 10/18/2012     IMM DTaP/Tdap/Td Vaccine Next Due 9/12/2027      Done 9/12/2017 Imm Admin: Tdap Vaccine          Patient Care Team:  Slava Worthy M.D. as PCP - General (Family Medicine)  Fadi Najjar, M.D. as Consulting Physician (Nephrology)    Social History   Substance Use Topics   • Smoking status: Former Smoker     Packs/day: 1.00     Years: 2.00     Types: Cigarettes     Quit date: 01/1967   • Smokeless tobacco: Never Used   • Alcohol use No  "    Family History   Problem Relation Age of Onset   • Lung Disease Mother    • Diabetes Mother    • Cancer Mother    • Heart Disease Father      She  has a past medical history of Back pain; GERD (gastroesophageal reflux disease); and Hyperlipidemia.   History reviewed. No pertinent surgical history.        Exam:     Blood pressure 116/78, pulse 67, temperature 36.7 °C (98 °F), resp. rate 16, height 1.638 m (5' 4.5\"), weight 61.7 kg (136 lb), SpO2 96 %. Body mass index is 22.98 kg/m².    Hearing good.    Dentition good  Alert, oriented in no acute distress.  Eye contact is good, speech goal directed, affect calm      Assessment and Plan. The following treatment and monitoring plan is recommended:    1. Medicare annual wellness visit, subsequent      2. Need for vaccination    - INFLUENZA VACCINE, HIGH DOSE (65+ ONLY)  - Pneumococal Polysaccharide Vaccine 23-Valent =>1yo SQ/IM    3. Encounter for screening mammogram for high-risk patient    - MA-SCREENING MAMMO BILAT W/TOMOSYNTHESIS W/CAD; Future    4. GERD without esophagitis  Patient is currently being treated for gerd, taking meds with no new symptoms or side effects, is trying to modify diet with decreased acidic foods, caffeine, etoh.  controlled        5. Mixed hyperlipidemia  Currently treated for HLD, taking meds with no new myalgias or joint pain, watching fats in diet  controlled        6. Stage 1 chronic kidney disease  Patient is currently being followed for chronic renal insufficiency. They are avoiding nsaids and maintaining hydration and following renal diet. Taking all appropriate meds. No new change in urine frequency or volume.      7. Trigger finger of left hand, unspecified finger  Using massage and ROM exercises now, if it worsens she will let us know and we can do ortho referral    Past Medical History:   Diagnosis Date   • Back pain     L4-5 disc that occasionally hurts   • GERD (gastroesophageal reflux disease)    • Hyperlipidemia  "     History reviewed. No pertinent surgical history.  Social History   Substance Use Topics   • Smoking status: Former Smoker     Packs/day: 1.00     Years: 2.00     Types: Cigarettes     Quit date: 01/1967   • Smokeless tobacco: Never Used   • Alcohol use No     Family History   Problem Relation Age of Onset   • Lung Disease Mother    • Diabetes Mother    • Cancer Mother    • Heart Disease Father    • Heart Disease Maternal Grandmother    • No Known Problems Maternal Grandfather    • No Known Problems Paternal Grandmother    • No Known Problems Paternal Grandfather          Current Outpatient Prescriptions:   •  simvastatin (ZOCOR) 20 MG Tab, Take 1 Tab by mouth every evening., Disp: 90 Tab, Rfl: 1  •  vitamin e (VITAMIN E) 400 UNIT Cap, Take 400 Units by mouth every day., Disp: , Rfl:   •  rabeprazole (ACIPHEX) 20 MG tablet, Take 1 Tab by mouth every day., Disp: 30 Tab, Rfl: 9  •  multivitamin (THERAGRAN) Tab, Take 1 Tab by mouth every day., Disp: , Rfl:   •  ascorbic acid (ASCORBIC ACID) 500 MG Tab, Take 500 mg by mouth every day., Disp: , Rfl:   •  Cholecalciferol (VITAMIN D-3 PO), Take  by mouth., Disp: , Rfl:   •  Iron Tab, Take  by mouth., Disp: , Rfl:   •  Omega-3 Fatty Acids (FISH OIL) 1000 MG Cap capsule, Take 1,000 mg by mouth 3 times a day, with meals., Disp: , Rfl:   •  Ginkgo Biloba (GINKOBA) 40 MG Tab, Take  by mouth., Disp: , Rfl:   •  LECITHIN PO, Take  by mouth., Disp: , Rfl:   •  aspirin (ASA) 81 MG Chew Tab chewable tablet, Take 81 mg by mouth every day., Disp: , Rfl:   •  Acetaminophen (TYLENOL 8 HOUR PO), Take  by mouth., Disp: , Rfl:   •  Ibuprofen (MOTRIN IB PO), Take  by mouth., Disp: , Rfl:   •  rabeprazole (ACIPHEX) 20 MG tablet, Take 1 Tab by mouth every day., Disp: 90 Tab, Rfl: 2    Patient was instructed on the use of medications, either prescriptions or OTC and informed on when the appropriate follow up time period should be. In addition, patient was also instructed that should any  acute worsening occur that they should notify this clinic asap or call 911.          Services suggested: No services needed at this time  Health Care Screening recommendations as per orders if indicated.  Referrals offered: PT/OT/Nutrition counseling/Behavioral Health/Smoking cessation as per orders if indicated.    Discussion today about general wellness and lifestyle habits:    · Prevent falls and reduce trip hazards; Cautioned about securing or removing rugs.  · Have a working fire alarm and carbon monoxide detector;   · Engage in regular physical activity and social activities       Follow-up: No Follow-up on file.

## 2018-09-25 ENCOUNTER — OFFICE VISIT (OUTPATIENT)
Dept: MEDICAL GROUP | Facility: PHYSICIAN GROUP | Age: 73
End: 2018-09-25
Payer: MEDICARE

## 2018-09-25 ENCOUNTER — HOSPITAL ENCOUNTER (OUTPATIENT)
Facility: MEDICAL CENTER | Age: 73
End: 2018-09-25
Attending: NURSE PRACTITIONER
Payer: MEDICARE

## 2018-09-25 VITALS
RESPIRATION RATE: 18 BRPM | OXYGEN SATURATION: 95 % | SYSTOLIC BLOOD PRESSURE: 118 MMHG | DIASTOLIC BLOOD PRESSURE: 72 MMHG | TEMPERATURE: 97.3 F | WEIGHT: 137.4 LBS | BODY MASS INDEX: 22.89 KG/M2 | HEIGHT: 65 IN | HEART RATE: 74 BPM

## 2018-09-25 DIAGNOSIS — Z00.00 ANNUAL PHYSICAL EXAM: ICD-10-CM

## 2018-09-25 DIAGNOSIS — Z01.419 ENCOUNTER FOR GYNECOLOGICAL EXAMINATION: ICD-10-CM

## 2018-09-25 PROCEDURE — 87591 N.GONORRHOEAE DNA AMP PROB: CPT | Mod: GZ

## 2018-09-25 PROCEDURE — G0101 CA SCREEN;PELVIC/BREAST EXAM: HCPCS | Performed by: NURSE PRACTITIONER

## 2018-09-25 PROCEDURE — 87624 HPV HI-RISK TYP POOLED RSLT: CPT

## 2018-09-25 PROCEDURE — 88175 CYTOPATH C/V AUTO FLUID REDO: CPT

## 2018-09-25 PROCEDURE — 87491 CHLMYD TRACH DNA AMP PROBE: CPT | Mod: GZ

## 2018-09-25 NOTE — PROGRESS NOTES
S:  Aura Ospina is a 73 y.o.,femalewho presents today for her Pap and GYN exam. She has been menopausal since age: 40s. She has not utilized HRT.      She is , P:0.    She has not had an Abnormal Pap previously.  Her last Mammogram was done:  One year ago.  Her preventative health screens are up to date.    GYN ROS: no abnormal bleeding, pelvic pain or discharge, no breast pain or new or enlarging lumps on self exam    Patient Active Problem List    Diagnosis Date Noted   • CKD (chronic kidney disease) 2016   • HLD (hyperlipidemia) 10/15/2015   • GERD without esophagitis 10/15/2015     Current Outpatient Prescriptions on File Prior to Visit   Medication Sig Dispense Refill   • rabeprazole (ACIPHEX) 20 MG tablet Take 1 Tab by mouth every day. 90 Tab 2   • simvastatin (ZOCOR) 20 MG Tab Take 1 Tab by mouth every evening. 90 Tab 1   • vitamin e (VITAMIN E) 400 UNIT Cap Take 400 Units by mouth every day.     • rabeprazole (ACIPHEX) 20 MG tablet Take 1 Tab by mouth every day. 30 Tab 9   • multivitamin (THERAGRAN) Tab Take 1 Tab by mouth every day.     • ascorbic acid (ASCORBIC ACID) 500 MG Tab Take 500 mg by mouth every day.     • Cholecalciferol (VITAMIN D-3 PO) Take  by mouth.     • Iron Tab Take  by mouth.     • Omega-3 Fatty Acids (FISH OIL) 1000 MG Cap capsule Take 1,000 mg by mouth 3 times a day, with meals.     • Ginkgo Biloba (GINKOBA) 40 MG Tab Take  by mouth.     • LECITHIN PO Take  by mouth.     • aspirin (ASA) 81 MG Chew Tab chewable tablet Take 81 mg by mouth every day.     • Acetaminophen (TYLENOL 8 HOUR PO) Take  by mouth.     • Ibuprofen (MOTRIN IB PO) Take  by mouth.       No current facility-administered medications on file prior to visit.      Social History   Substance Use Topics   • Smoking status: Former Smoker     Packs/day: 1.00     Years: 2.00     Types: Cigarettes     Quit date: 1967   • Smokeless tobacco: Never Used   • Alcohol use No     O: /72 (BP Location: Left arm,  "Patient Position: Sitting)   Pulse 74   Temp 36.3 °C (97.3 °F) (Temporal)   Resp 18   Ht 1.638 m (5' 4.5\")   Wt 62.3 kg (137 lb 6.4 oz)   SpO2 95%   BMI 23.22 kg/m²   Vitals Noted and Reviewed    Breasts: breasts symmetric, no dominant or suspicious mass, no skin or nipple changes and no axillary adenopathy  Vulva: grossly unremarkable, no lesions or masses noted  Vagina: no abnormal discharge, normal color  Cervix: Parous, nonfriable, no surface lesions identified, Pap was performed  Uterus: Normal shape, position and consistency  Bimanual exam: No uteromegaly, negative chandelier sign, adnexa freely movable and without enlargements bilaterally    Assessment and Plan:    1. Annual physical exam  She was counseled on breast self exam and mammography screening  return annually or prn. I did advise that medicare may not cover annual preventative exam and pap smear. I also advised that pap screening is no longer required after age 65 if she has never had an abnormal pap. Patient verbalized understanding of potential financial cost of screening and would to proceed with pap smear today.    2. Encounter for gynecological examination  - THINPREP PAP W/HPV AND CTNG; Future    "

## 2018-09-27 DIAGNOSIS — Z01.419 ENCOUNTER FOR GYNECOLOGICAL EXAMINATION: ICD-10-CM

## 2018-09-28 LAB
C TRACH DNA GENITAL QL NAA+PROBE: NEGATIVE
CYTOLOGY REG CYTOL: NORMAL
HPV HR 12 DNA CVX QL NAA+PROBE: NEGATIVE
HPV16 DNA SPEC QL NAA+PROBE: NEGATIVE
HPV18 DNA SPEC QL NAA+PROBE: NEGATIVE
N GONORRHOEA DNA GENITAL QL NAA+PROBE: NEGATIVE
SPECIMEN SOURCE: NORMAL
SPECIMEN SOURCE: NORMAL

## 2018-10-01 ENCOUNTER — TELEPHONE (OUTPATIENT)
Dept: MEDICAL GROUP | Facility: PHYSICIAN GROUP | Age: 73
End: 2018-10-01

## 2018-10-01 DIAGNOSIS — L57.8 SOLAR ELASTOSIS: ICD-10-CM

## 2018-10-01 NOTE — TELEPHONE ENCOUNTER
----- Message from MENDY Sánchez sent at 10/1/2018 11:29 AM PDT -----  Pap smear results shows no abnormal findings including malignancy or infection.  Routine screening in 3 years is recommended.    MENDY Sánchez,TRACIEP-C

## 2018-10-24 DIAGNOSIS — K21.9 GERD WITHOUT ESOPHAGITIS: ICD-10-CM

## 2018-10-24 NOTE — TELEPHONE ENCOUNTER
Was the patient seen in the last year in this department? Yes    Does patient have an active prescription for medications requested? Yes    Received Request Via: Pharmacy     Last visit 9/25/2018  Last labs 9/05/2018

## 2018-10-25 RX ORDER — SIMVASTATIN 20 MG
TABLET ORAL
Qty: 90 TAB | Refills: 2 | Status: SHIPPED | OUTPATIENT
Start: 2018-10-25 | End: 2019-07-22 | Stop reason: SDUPTHER

## 2018-12-31 ENCOUNTER — TELEPHONE (OUTPATIENT)
Dept: NEPHROLOGY | Facility: MEDICAL CENTER | Age: 73
End: 2018-12-31

## 2019-01-08 DIAGNOSIS — N18.9 CHRONIC KIDNEY DISEASE, UNSPECIFIED CKD STAGE: ICD-10-CM

## 2019-03-05 ENCOUNTER — OFFICE VISIT (OUTPATIENT)
Dept: NEPHROLOGY | Facility: MEDICAL CENTER | Age: 74
End: 2019-03-05
Payer: MEDICARE

## 2019-03-05 VITALS
DIASTOLIC BLOOD PRESSURE: 72 MMHG | HEART RATE: 70 BPM | OXYGEN SATURATION: 99 % | TEMPERATURE: 97.6 F | BODY MASS INDEX: 22.82 KG/M2 | RESPIRATION RATE: 16 BRPM | SYSTOLIC BLOOD PRESSURE: 120 MMHG | WEIGHT: 137 LBS | HEIGHT: 65 IN

## 2019-03-05 DIAGNOSIS — N18.9 CHRONIC KIDNEY DISEASE, UNSPECIFIED CKD STAGE: ICD-10-CM

## 2019-03-05 PROCEDURE — 99214 OFFICE O/P EST MOD 30 MIN: CPT | Performed by: INTERNAL MEDICINE

## 2019-03-05 ASSESSMENT — ENCOUNTER SYMPTOMS
SHORTNESS OF BREATH: 0
VOMITING: 0
CHILLS: 0
NAUSEA: 0
COUGH: 0
FEVER: 0

## 2019-03-05 NOTE — PROGRESS NOTES
"Subjective:      Aura Ospina is a 73 y.o. female who presents with Chronic Kidney Disease            Chronic Kidney Disease   This is a chronic problem. The current episode started more than 1 year ago. The problem occurs constantly. The problem has been unchanged. Pertinent negatives include no chest pain, chills, coughing, fever, nausea, urinary symptoms or vomiting.       Review of Systems   Constitutional: Negative for chills, fever and malaise/fatigue.   Respiratory: Negative for cough and shortness of breath.    Cardiovascular: Negative for chest pain and leg swelling.   Gastrointestinal: Negative for nausea and vomiting.   Genitourinary: Negative for dysuria, frequency and urgency.          Objective:     /72 (BP Location: Right arm, Patient Position: Sitting)   Pulse 70   Temp 36.4 °C (97.6 °F)   Resp 16   Ht 1.638 m (5' 4.5\")   Wt 62.1 kg (137 lb)   SpO2 99%   BMI 23.15 kg/m²      Physical Exam   Constitutional: She is oriented to person, place, and time. She appears well-developed and well-nourished. No distress.   HENT:   Head: Normocephalic and atraumatic.   Right Ear: External ear normal.   Left Ear: External ear normal.   Nose: Nose normal.   Eyes: Conjunctivae are normal. Right eye exhibits no discharge. Left eye exhibits no discharge. No scleral icterus.   Cardiovascular: Normal rate and regular rhythm.    Pulmonary/Chest: Effort normal and breath sounds normal. No respiratory distress.   Musculoskeletal: She exhibits no edema.   Neurological: She is alert and oriented to person, place, and time.   Skin: Skin is warm. She is not diaphoretic.   Psychiatric: She has a normal mood and affect. Her behavior is normal.   Nursing note and vitals reviewed.              Assessment/Plan:     1. Chronic kidney disease, unspecified CKD stage  Stable  No uremic symptoms  Renal dose of medication  Avoid nephrotoxins like NSAIDs      "

## 2019-05-06 ENCOUNTER — TELEPHONE (OUTPATIENT)
Dept: MEDICAL GROUP | Facility: PHYSICIAN GROUP | Age: 74
End: 2019-05-06

## 2019-05-21 DIAGNOSIS — E78.49 OTHER HYPERLIPIDEMIA: ICD-10-CM

## 2019-05-21 DIAGNOSIS — K21.9 GERD WITHOUT ESOPHAGITIS: ICD-10-CM

## 2019-05-21 RX ORDER — RABEPRAZOLE SODIUM 20 MG/1
20 TABLET, DELAYED RELEASE ORAL DAILY
Qty: 90 TAB | Refills: 3 | Status: SHIPPED | OUTPATIENT
Start: 2019-05-21 | End: 2020-05-04 | Stop reason: SDUPTHER

## 2019-07-22 ENCOUNTER — TELEPHONE (OUTPATIENT)
Dept: MEDICAL GROUP | Facility: PHYSICIAN GROUP | Age: 74
End: 2019-07-22

## 2019-07-22 DIAGNOSIS — E78.2 MIXED HYPERLIPIDEMIA: ICD-10-CM

## 2019-07-22 DIAGNOSIS — K21.9 GERD WITHOUT ESOPHAGITIS: ICD-10-CM

## 2019-07-22 DIAGNOSIS — N18.9 CHRONIC KIDNEY DISEASE, UNSPECIFIED CKD STAGE: ICD-10-CM

## 2019-07-22 RX ORDER — SIMVASTATIN 20 MG
TABLET ORAL
Qty: 100 TAB | Refills: 2 | Status: SHIPPED | OUTPATIENT
Start: 2019-07-22 | End: 2020-05-04 | Stop reason: SDUPTHER

## 2019-09-09 ENCOUNTER — HOSPITAL ENCOUNTER (OUTPATIENT)
Dept: LAB | Facility: MEDICAL CENTER | Age: 74
End: 2019-09-09
Attending: FAMILY MEDICINE
Payer: MEDICARE

## 2019-09-09 DIAGNOSIS — N18.9 CHRONIC KIDNEY DISEASE, UNSPECIFIED CKD STAGE: ICD-10-CM

## 2019-09-09 DIAGNOSIS — E78.2 MIXED HYPERLIPIDEMIA: ICD-10-CM

## 2019-09-09 DIAGNOSIS — K21.9 GERD WITHOUT ESOPHAGITIS: ICD-10-CM

## 2019-09-09 LAB
ALBUMIN SERPL BCP-MCNC: 4.7 G/DL (ref 3.2–4.9)
ALBUMIN/GLOB SERPL: 1.6 G/DL
ALP SERPL-CCNC: 56 U/L (ref 30–99)
ALT SERPL-CCNC: 26 U/L (ref 2–50)
ANION GAP SERPL CALC-SCNC: 8 MMOL/L (ref 0–11.9)
AST SERPL-CCNC: 26 U/L (ref 12–45)
BILIRUB SERPL-MCNC: 0.6 MG/DL (ref 0.1–1.5)
BUN SERPL-MCNC: 21 MG/DL (ref 8–22)
CALCIUM SERPL-MCNC: 9.7 MG/DL (ref 8.5–10.5)
CHLORIDE SERPL-SCNC: 105 MMOL/L (ref 96–112)
CHOLEST SERPL-MCNC: 157 MG/DL (ref 100–199)
CO2 SERPL-SCNC: 28 MMOL/L (ref 20–33)
CREAT SERPL-MCNC: 1.07 MG/DL (ref 0.5–1.4)
ERYTHROCYTE [DISTWIDTH] IN BLOOD BY AUTOMATED COUNT: 42.8 FL (ref 35.9–50)
FASTING STATUS PATIENT QL REPORTED: NORMAL
GLOBULIN SER CALC-MCNC: 3 G/DL (ref 1.9–3.5)
GLUCOSE SERPL-MCNC: 91 MG/DL (ref 65–99)
HCT VFR BLD AUTO: 41.3 % (ref 37–47)
HDLC SERPL-MCNC: 50 MG/DL
HGB BLD-MCNC: 13.2 G/DL (ref 12–16)
LDLC SERPL CALC-MCNC: 72 MG/DL
MCH RBC QN AUTO: 30.9 PG (ref 27–33)
MCHC RBC AUTO-ENTMCNC: 32 G/DL (ref 33.6–35)
MCV RBC AUTO: 96.7 FL (ref 81.4–97.8)
PLATELET # BLD AUTO: 217 K/UL (ref 164–446)
PMV BLD AUTO: 11.8 FL (ref 9–12.9)
POTASSIUM SERPL-SCNC: 4.2 MMOL/L (ref 3.6–5.5)
PROT SERPL-MCNC: 7.7 G/DL (ref 6–8.2)
RBC # BLD AUTO: 4.27 M/UL (ref 4.2–5.4)
SODIUM SERPL-SCNC: 141 MMOL/L (ref 135–145)
TRIGL SERPL-MCNC: 177 MG/DL (ref 0–149)
WBC # BLD AUTO: 6.9 K/UL (ref 4.8–10.8)

## 2019-09-09 PROCEDURE — 36415 COLL VENOUS BLD VENIPUNCTURE: CPT

## 2019-09-09 PROCEDURE — 80061 LIPID PANEL: CPT

## 2019-09-09 PROCEDURE — 85027 COMPLETE CBC AUTOMATED: CPT

## 2019-09-09 PROCEDURE — 80053 COMPREHEN METABOLIC PANEL: CPT

## 2019-09-16 ENCOUNTER — OFFICE VISIT (OUTPATIENT)
Dept: MEDICAL GROUP | Facility: PHYSICIAN GROUP | Age: 74
End: 2019-09-16
Payer: MEDICARE

## 2019-09-16 VITALS
HEART RATE: 68 BPM | BODY MASS INDEX: 23.92 KG/M2 | TEMPERATURE: 97 F | WEIGHT: 135 LBS | SYSTOLIC BLOOD PRESSURE: 124 MMHG | OXYGEN SATURATION: 97 % | DIASTOLIC BLOOD PRESSURE: 70 MMHG | HEIGHT: 63 IN | RESPIRATION RATE: 16 BRPM

## 2019-09-16 DIAGNOSIS — E78.2 MIXED HYPERLIPIDEMIA: ICD-10-CM

## 2019-09-16 DIAGNOSIS — N18.9 CHRONIC KIDNEY DISEASE, UNSPECIFIED CKD STAGE: ICD-10-CM

## 2019-09-16 DIAGNOSIS — Z00.00 MEDICARE ANNUAL WELLNESS VISIT, SUBSEQUENT: ICD-10-CM

## 2019-09-16 DIAGNOSIS — K21.9 GERD WITHOUT ESOPHAGITIS: ICD-10-CM

## 2019-09-16 PROCEDURE — G0439 PPPS, SUBSEQ VISIT: HCPCS | Performed by: FAMILY MEDICINE

## 2019-09-16 ASSESSMENT — ACTIVITIES OF DAILY LIVING (ADL): BATHING_REQUIRES_ASSISTANCE: 0

## 2019-09-16 ASSESSMENT — ENCOUNTER SYMPTOMS: GENERAL WELL-BEING: EXCELLENT

## 2019-09-16 ASSESSMENT — PATIENT HEALTH QUESTIONNAIRE - PHQ9: CLINICAL INTERPRETATION OF PHQ2 SCORE: 0

## 2019-09-16 NOTE — PROGRESS NOTES
Chief Complaint   Patient presents with   • Annual Wellness Visit         HPI:  Aura Ospina is a 74 y.o. here for Medicare Annual Wellness Visit     Patient Active Problem List    Diagnosis Date Noted   • CKD (chronic kidney disease) 09/20/2016   • HLD (hyperlipidemia) 10/15/2015   • GERD without esophagitis 10/15/2015       Current Outpatient Medications   Medication Sig Dispense Refill   • simvastatin (ZOCOR) 20 MG Tab TAKE 1 TABLET BY MOUTH EVERY EVENING 100 Tab 2   • rabeprazole (ACIPHEX) 20 MG tablet Take 1 Tab by mouth every day. 90 Tab 3   • rabeprazole (ACIPHEX) 20 MG tablet Take 1 Tab by mouth every day. 90 Tab 2   • vitamin e (VITAMIN E) 400 UNIT Cap Take 400 Units by mouth every day.     • multivitamin (THERAGRAN) Tab Take 1 Tab by mouth every day.     • ascorbic acid (ASCORBIC ACID) 500 MG Tab Take 500 mg by mouth every day.     • Cholecalciferol (VITAMIN D-3 PO) Take  by mouth.     • Iron Tab Take  by mouth.     • Omega-3 Fatty Acids (FISH OIL) 1000 MG Cap capsule Take 1,000 mg by mouth 3 times a day, with meals.     • Ginkgo Biloba (GINKOBA) 40 MG Tab Take  by mouth.     • LECITHIN PO Take  by mouth.     • aspirin (ASA) 81 MG Chew Tab chewable tablet Take 81 mg by mouth every day.     • Acetaminophen (TYLENOL 8 HOUR PO) Take  by mouth.     • Ibuprofen (MOTRIN IB PO) Take  by mouth.       No current facility-administered medications for this visit.             Current supplements as per medication list.       Allergies: Penicillins; Food; and Sulfa drugs    Current social contact/activities:     She  reports that she quit smoking about 52 years ago. Her smoking use included cigarettes. She has a 2.00 pack-year smoking history. She has never used smokeless tobacco. She reports that she does not drink alcohol or use drugs.  Counseling given: Not Answered      DPA/Advanced Directive:  Patient has Living Will on file.     ROS:    Gait: Uses no assistive device  Ostomy: No  Other tubes: No  Amputations:  No  Chronic oxygen use: No  Last eye exam: last November  Wears hearing aids: No   : Denies any urinary leakage during the last 6 months    Screening:    Depression Screening    Little interest or pleasure in doing things?  0 - not at all  Feeling down, depressed , or hopeless? 0 - not at all  Patient Health Questionnaire Score: 0     If depressive symptoms identified deferred to follow up visit unless specifically addressed in assessment and plan.    Interpretation of PHQ-9 Total Score   Score Severity   1-4 No Depression   5-9 Mild Depression   10-14 Moderate Depression   15-19 Moderately Severe Depression   20-27 Severe Depression    Screening for Cognitive Impairment    Three Minute Recall (village, kitchen, baby) 3/3    Festus clock face with all 12 numbers and set the hands to show 10 past 10.  Yes    Cognitive concerns identified deferred for follow up unless specifically addressed in assessment and plan.    Fall Risk Assessment    Has the patient had two or more falls in the last year or any fall with injury in the last year?  No    Safety Assessment    Throw rugs on floor.  Yes  Handrails on all stairs.  No  Good lighting in all hallways.  Yes  Difficulty hearing.  No  Patient counseled about all safety risks that were identified.    Functional Assessment ADLs    Are there any barriers preventing you from cooking for yourself or meeting nutritional needs?  No.    Are there any barriers preventing you from driving safely or obtaining transportation?  No.    Are there any barriers preventing you from using a telephone or calling for help?  No.    Are there any barriers preventing you from shopping?  No.    Are there any barriers preventing you from taking care of your own finances?  No.    Are there any barriers preventing you from managing your medications?  No.    Are there any barriers preventing you from showering, bathing or dressing yourself?  No.    Are you currently engaging in any exercise or  physical activity?  No.     What is your perception of your health?  Excellent.      Health Maintenance Summary                IMM HEP B VACCINE Overdue 1963     MAMMOGRAM Overdue 2016      Done 2015 MA-SCREEN MAMMO W/CAD-BILAT    IMM INFLUENZA Overdue 2019      Done 2018 Imm Admin: Influenza Vaccine Adult HD     Patient has more history with this topic...    Annual Wellness Visit Overdue 2019      Done 2018      Patient has more history with this topic...    IMM ZOSTER VACCINES Postponed 2019 Originally 2012. System: vaccine not available, other system reasons     Done 2012 Imm Admin: Zoster Vaccine Live (ZVL) (Zostavax)    BONE DENSITY Next Due 10/26/2020      Done 10/26/2015 DS-BONE DENSITY STUDY (DEXA)     Patient has more history with this topic...    PAP SMEAR Next Due 2021      Done 2018 PATHOLOGY GYN SPECIMEN     Patient has more history with this topic...    COLONOSCOPY Next Due 10/18/2022      Done 10/18/2012     IMM DTaP/Tdap/Td Vaccine Next Due 2027      Done 2017 Imm Admin: Tdap Vaccine          Patient Care Team:  Slava Worthy M.D. as PCP - General (Family Medicine)  Fadi Najjar, M.D. as Consulting Physician (Nephrology)        Social History     Tobacco Use   • Smoking status: Former Smoker     Packs/day: 1.00     Years: 2.00     Pack years: 2.00     Types: Cigarettes     Last attempt to quit: 1967     Years since quittin.7   • Smokeless tobacco: Never Used   Substance Use Topics   • Alcohol use: No   • Drug use: No     Family History   Problem Relation Age of Onset   • Lung Disease Mother    • Diabetes Mother    • Cancer Mother    • Heart Disease Father    • Heart Disease Maternal Grandmother    • No Known Problems Maternal Grandfather    • No Known Problems Paternal Grandmother    • No Known Problems Paternal Grandfather      She  has a past medical history of Back pain, GERD (gastroesophageal reflux disease), and  Hyperlipidemia.   No past surgical history on file.    Exam:   There were no vitals taken for this visit. There is no height or weight on file to calculate BMI.    Hearing good.    Dentition good  Alert, oriented in no acute distress.  Eye contact is good, speech goal directed, affect calm    Assessment and Plan. The following treatment and monitoring plan is recommended:    1. Medicare annual wellness visit, subsequent  utd on     2. Chronic kidney disease, unspecified CKD stage  Patient is currently being followed for chronic renal insufficiency. They are avoiding nsaids and maintaining hydration and following renal diet. Taking all appropriate meds. No new change in urine frequency or volume.    - URINALYSIS; Future    3. Mixed hyperlipidemia  Currently treated for HLD, taking meds with no new myalgias or joint pain, watching fats in diet  controlled        4. GERD without esophagitis  Patient is currently being treated for gerd, taking meds with no new symptoms or side effects, is trying to modify diet with decreased acidic foods, caffeine, etoh.  controlled        Past Medical History:   Diagnosis Date   • Back pain     L4-5 disc that occasionally hurts   • GERD (gastroesophageal reflux disease)    • Hyperlipidemia      History reviewed. No pertinent surgical history.  Social History     Tobacco Use   • Smoking status: Former Smoker     Packs/day: 1.00     Years: 2.00     Pack years: 2.00     Types: Cigarettes     Last attempt to quit: 1967     Years since quittin.7   • Smokeless tobacco: Never Used   Substance Use Topics   • Alcohol use: No   • Drug use: No     Family History   Problem Relation Age of Onset   • Lung Disease Mother    • Diabetes Mother    • Cancer Mother    • Heart Disease Father    • Heart Disease Maternal Grandmother    • No Known Problems Maternal Grandfather    • No Known Problems Paternal Grandmother    • No Known Problems Paternal Grandfather          Current Outpatient  Medications:   •  simvastatin (ZOCOR) 20 MG Tab, TAKE 1 TABLET BY MOUTH EVERY EVENING, Disp: 100 Tab, Rfl: 2  •  rabeprazole (ACIPHEX) 20 MG tablet, Take 1 Tab by mouth every day., Disp: 90 Tab, Rfl: 3  •  rabeprazole (ACIPHEX) 20 MG tablet, Take 1 Tab by mouth every day., Disp: 90 Tab, Rfl: 2  •  vitamin e (VITAMIN E) 400 UNIT Cap, Take 400 Units by mouth every day., Disp: , Rfl:   •  multivitamin (THERAGRAN) Tab, Take 1 Tab by mouth every day., Disp: , Rfl:   •  ascorbic acid (ASCORBIC ACID) 500 MG Tab, Take 500 mg by mouth every day., Disp: , Rfl:   •  Cholecalciferol (VITAMIN D-3 PO), Take  by mouth., Disp: , Rfl:   •  Iron Tab, Take  by mouth., Disp: , Rfl:   •  Omega-3 Fatty Acids (FISH OIL) 1000 MG Cap capsule, Take 1,000 mg by mouth 3 times a day, with meals., Disp: , Rfl:   •  Ginkgo Biloba (GINKOBA) 40 MG Tab, Take  by mouth., Disp: , Rfl:   •  LECITHIN PO, Take  by mouth., Disp: , Rfl:   •  aspirin (ASA) 81 MG Chew Tab chewable tablet, Take 81 mg by mouth every day., Disp: , Rfl:   •  Acetaminophen (TYLENOL 8 HOUR PO), Take  by mouth., Disp: , Rfl:   •  Ibuprofen (MOTRIN IB PO), Take  by mouth., Disp: , Rfl:     Patient was instructed on the use of medications, either prescriptions or OTC and informed on when the appropriate follow up time period should be. In addition, patient was also instructed that should any acute worsening occur that they should notify this clinic asap or call 911.          Services suggested: No services needed at this time  Health Care Screening: Age-appropriate preventive services recommended by USPTF and ACIP covered by Medicare were discussed today. Services ordered if indicated and agreed upon by the patient.  Referrals offered: Community-based lifestyle interventions to reduce health risks and promote self-management and wellness, fall prevention, nutrition, physical activity, tobacco-use cessation, weight loss, and mental health services as per orders if  indicated.    Discussion today about general wellness and lifestyle habits:    · Prevent falls and reduce trip hazards; Cautioned about securing or removing rugs.  · Have a working fire alarm and carbon monoxide detector;   · Engage in regular physical activity and social activities     Follow-up: No follow-ups on file.

## 2019-10-10 ENCOUNTER — NON-PROVIDER VISIT (OUTPATIENT)
Dept: MEDICAL GROUP | Facility: PHYSICIAN GROUP | Age: 74
End: 2019-10-10
Payer: MEDICARE

## 2019-10-10 DIAGNOSIS — Z23 NEED FOR VACCINATION: ICD-10-CM

## 2019-10-10 PROCEDURE — 90662 IIV NO PRSV INCREASED AG IM: CPT | Performed by: FAMILY MEDICINE

## 2019-10-10 PROCEDURE — G0008 ADMIN INFLUENZA VIRUS VAC: HCPCS | Performed by: FAMILY MEDICINE

## 2020-01-09 ENCOUNTER — APPOINTMENT (OUTPATIENT)
Dept: LAB | Facility: MEDICAL CENTER | Age: 75
End: 2020-01-09
Payer: MEDICARE

## 2020-03-03 ENCOUNTER — OFFICE VISIT (OUTPATIENT)
Dept: NEPHROLOGY | Facility: MEDICAL CENTER | Age: 75
End: 2020-03-03
Payer: MEDICARE

## 2020-03-03 VITALS
RESPIRATION RATE: 14 BRPM | BODY MASS INDEX: 23.73 KG/M2 | SYSTOLIC BLOOD PRESSURE: 134 MMHG | TEMPERATURE: 98.4 F | HEIGHT: 64 IN | DIASTOLIC BLOOD PRESSURE: 72 MMHG | OXYGEN SATURATION: 96 % | HEART RATE: 78 BPM | WEIGHT: 139 LBS

## 2020-03-03 DIAGNOSIS — N18.30 CKD (CHRONIC KIDNEY DISEASE) STAGE 3, GFR 30-59 ML/MIN: ICD-10-CM

## 2020-03-03 PROCEDURE — 99214 OFFICE O/P EST MOD 30 MIN: CPT | Performed by: INTERNAL MEDICINE

## 2020-03-03 ASSESSMENT — ENCOUNTER SYMPTOMS
NAUSEA: 0
SHORTNESS OF BREATH: 0
COUGH: 0
VOMITING: 0
CHILLS: 0
FEVER: 0

## 2020-03-03 ASSESSMENT — FIBROSIS 4 INDEX: FIB4 SCORE: 1.76

## 2020-03-04 NOTE — PROGRESS NOTES
"Subjective:      Aura Ospina is a 74 y.o. female who presents with Chronic Kidney Disease            Chronic Kidney Disease   This is a chronic problem. The current episode started more than 1 year ago. The problem occurs constantly. The problem has been unchanged. Pertinent negatives include no chest pain, chills, coughing, fever, nausea, urinary symptoms or vomiting.       Review of Systems   Constitutional: Negative for chills, fever and malaise/fatigue.   Respiratory: Negative for cough and shortness of breath.    Cardiovascular: Negative for chest pain and leg swelling.   Gastrointestinal: Negative for nausea and vomiting.   Genitourinary: Negative for dysuria, frequency and urgency.          Objective:     /72 (BP Location: Right arm, Patient Position: Sitting)   Pulse 78   Temp 36.9 °C (98.4 °F)   Resp 14   Ht 1.626 m (5' 4\")   Wt 63 kg (139 lb)   SpO2 96%   BMI 23.86 kg/m²      Physical Exam  Vitals signs and nursing note reviewed.   Constitutional:       General: She is not in acute distress.     Appearance: Normal appearance. She is well-developed. She is not diaphoretic.   HENT:      Head: Normocephalic and atraumatic.      Right Ear: External ear normal.      Left Ear: External ear normal.      Nose: Nose normal.   Eyes:      General: No scleral icterus.        Right eye: No discharge.         Left eye: No discharge.      Conjunctiva/sclera: Conjunctivae normal.   Cardiovascular:      Rate and Rhythm: Normal rate and regular rhythm.      Heart sounds: No murmur.   Pulmonary:      Effort: Pulmonary effort is normal. No respiratory distress.      Breath sounds: Normal breath sounds.   Musculoskeletal:         General: No tenderness.      Right lower leg: No edema.      Left lower leg: No edema.   Skin:     General: Skin is warm and dry.      Findings: No erythema.   Neurological:      General: No focal deficit present.      Mental Status: She is alert and oriented to person, place, and time. "      Cranial Nerves: No cranial nerve deficit.   Psychiatric:         Mood and Affect: Mood normal.         Behavior: Behavior normal.       Past Medical History:   Diagnosis Date   • Back pain     L4-5 disc that occasionally hurts   • GERD (gastroesophageal reflux disease)    • Hyperlipidemia      Social History     Socioeconomic History   • Marital status: Unknown     Spouse name: Not on file   • Number of children: Not on file   • Years of education: Not on file   • Highest education level: Not on file   Occupational History   • Not on file   Social Needs   • Financial resource strain: Not on file   • Food insecurity     Worry: Not on file     Inability: Not on file   • Transportation needs     Medical: Not on file     Non-medical: Not on file   Tobacco Use   • Smoking status: Former Smoker     Packs/day: 1.00     Years: 2.00     Pack years: 2.00     Types: Cigarettes     Last attempt to quit: 1967     Years since quittin.2   • Smokeless tobacco: Never Used   Substance and Sexual Activity   • Alcohol use: No   • Drug use: No   • Sexual activity: Never     Partners: Female   Lifestyle   • Physical activity     Days per week: Not on file     Minutes per session: Not on file   • Stress: Not on file   Relationships   • Social connections     Talks on phone: Not on file     Gets together: Not on file     Attends Taoist service: Not on file     Active member of club or organization: Not on file     Attends meetings of clubs or organizations: Not on file     Relationship status: Not on file   • Intimate partner violence     Fear of current or ex partner: Not on file     Emotionally abused: Not on file     Physically abused: Not on file     Forced sexual activity: Not on file   Other Topics Concern   • Not on file   Social History Narrative   • Not on file     Family History   Problem Relation Age of Onset   • Lung Disease Mother    • Diabetes Mother    • Cancer Mother    • Heart Disease Father    • Heart  Disease Maternal Grandmother    • No Known Problems Maternal Grandfather    • No Known Problems Paternal Grandmother    • No Known Problems Paternal Grandfather      Recent Labs     09/09/19  0759   ALBUMIN 4.7   HDL 50   TRIGLYCERIDE 177*   SODIUM 141   POTASSIUM 4.2   CHLORIDE 105   CO2 28   BUN 21   CREATININE 1.07                 Assessment/Plan:       1 CKD III  Stable  No uremic symptoms  Renal dose of medication  Avoid nephrotoxins  Continue same medication regimen  Check labs annually

## 2020-05-04 DIAGNOSIS — K21.9 GERD WITHOUT ESOPHAGITIS: ICD-10-CM

## 2020-05-04 DIAGNOSIS — N18.9 CHRONIC KIDNEY DISEASE, UNSPECIFIED CKD STAGE: ICD-10-CM

## 2020-05-04 DIAGNOSIS — E78.49 OTHER HYPERLIPIDEMIA: ICD-10-CM

## 2020-05-04 DIAGNOSIS — E78.2 MIXED HYPERLIPIDEMIA: ICD-10-CM

## 2020-05-04 RX ORDER — SIMVASTATIN 20 MG
TABLET ORAL
Qty: 100 TAB | Refills: 2 | Status: SHIPPED | OUTPATIENT
Start: 2020-05-04 | End: 2021-01-28

## 2020-05-04 RX ORDER — RABEPRAZOLE SODIUM 20 MG/1
20 TABLET, DELAYED RELEASE ORAL DAILY
Qty: 90 TAB | Refills: 3 | Status: SHIPPED | OUTPATIENT
Start: 2020-05-04 | End: 2021-04-28

## 2020-09-16 ENCOUNTER — HOSPITAL ENCOUNTER (OUTPATIENT)
Dept: LAB | Facility: MEDICAL CENTER | Age: 75
End: 2020-09-16
Attending: INTERNAL MEDICINE
Payer: MEDICARE

## 2020-09-16 DIAGNOSIS — N18.30 CKD (CHRONIC KIDNEY DISEASE) STAGE 3, GFR 30-59 ML/MIN: ICD-10-CM

## 2020-09-16 LAB
ANION GAP SERPL CALC-SCNC: 15 MMOL/L (ref 7–16)
BUN SERPL-MCNC: 18 MG/DL (ref 8–22)
CALCIUM SERPL-MCNC: 10.1 MG/DL (ref 8.5–10.5)
CHLORIDE SERPL-SCNC: 101 MMOL/L (ref 96–112)
CO2 SERPL-SCNC: 23 MMOL/L (ref 20–33)
CREAT SERPL-MCNC: 0.97 MG/DL (ref 0.5–1.4)
GLUCOSE SERPL-MCNC: 101 MG/DL (ref 65–99)
POTASSIUM SERPL-SCNC: 4.5 MMOL/L (ref 3.6–5.5)
SODIUM SERPL-SCNC: 139 MMOL/L (ref 135–145)

## 2020-09-16 PROCEDURE — 36415 COLL VENOUS BLD VENIPUNCTURE: CPT

## 2020-09-16 PROCEDURE — 80048 BASIC METABOLIC PNL TOTAL CA: CPT

## 2020-09-21 ENCOUNTER — OFFICE VISIT (OUTPATIENT)
Dept: MEDICAL GROUP | Facility: PHYSICIAN GROUP | Age: 75
End: 2020-09-21
Payer: MEDICARE

## 2020-09-21 ENCOUNTER — TELEPHONE (OUTPATIENT)
Dept: MEDICAL GROUP | Facility: PHYSICIAN GROUP | Age: 75
End: 2020-09-21

## 2020-09-21 VITALS
WEIGHT: 136.6 LBS | HEART RATE: 75 BPM | BODY MASS INDEX: 23.32 KG/M2 | OXYGEN SATURATION: 97 % | RESPIRATION RATE: 16 BRPM | TEMPERATURE: 97.5 F | HEIGHT: 64 IN | DIASTOLIC BLOOD PRESSURE: 82 MMHG | SYSTOLIC BLOOD PRESSURE: 128 MMHG

## 2020-09-21 DIAGNOSIS — Z00.00 MEDICARE ANNUAL WELLNESS VISIT, SUBSEQUENT: ICD-10-CM

## 2020-09-21 DIAGNOSIS — E78.49 OTHER HYPERLIPIDEMIA: ICD-10-CM

## 2020-09-21 DIAGNOSIS — Z23 NEED FOR VACCINATION: ICD-10-CM

## 2020-09-21 DIAGNOSIS — K21.9 GERD WITHOUT ESOPHAGITIS: ICD-10-CM

## 2020-09-21 DIAGNOSIS — N18.9 CHRONIC KIDNEY DISEASE, UNSPECIFIED CKD STAGE: ICD-10-CM

## 2020-09-21 PROCEDURE — 90750 HZV VACC RECOMBINANT IM: CPT | Performed by: FAMILY MEDICINE

## 2020-09-21 PROCEDURE — G0008 ADMIN INFLUENZA VIRUS VAC: HCPCS | Performed by: FAMILY MEDICINE

## 2020-09-21 PROCEDURE — G0439 PPPS, SUBSEQ VISIT: HCPCS | Mod: 25 | Performed by: FAMILY MEDICINE

## 2020-09-21 PROCEDURE — 90472 IMMUNIZATION ADMIN EACH ADD: CPT | Performed by: FAMILY MEDICINE

## 2020-09-21 PROCEDURE — 90662 IIV NO PRSV INCREASED AG IM: CPT | Performed by: FAMILY MEDICINE

## 2020-09-21 ASSESSMENT — ACTIVITIES OF DAILY LIVING (ADL): BATHING_REQUIRES_ASSISTANCE: 0

## 2020-09-21 ASSESSMENT — ENCOUNTER SYMPTOMS: GENERAL WELL-BEING: EXCELLENT

## 2020-09-21 ASSESSMENT — PATIENT HEALTH QUESTIONNAIRE - PHQ9: CLINICAL INTERPRETATION OF PHQ2 SCORE: 0

## 2020-09-21 ASSESSMENT — FIBROSIS 4 INDEX: FIB4 SCORE: 1.79

## 2020-09-21 NOTE — PROGRESS NOTES
Depression Screening    Little interest or pleasure in doing things?  0 - not at all  Feeling down, depressed , or hopeless? 0 - not at all  Patient Health Questionnaire Score: 0     If depressive symptoms identified deferred to follow up visit unless specifically addressed in assessment and plan.    Interpretation of PHQ-9 Total Score   Score Severity   1-4 No Depression   5-9 Mild Depression   10-14 Moderate Depression   15-19 Moderately Severe Depression   20-27 Severe Depression    Screening for Cognitive Impairment    Three Minute Recall ([place], [place], finger) 3/3    Festus clock face with all 12 numbers and set the hands to show 10 past 11.  Yes    Cognitive concerns identified deferred for follow up unless specifically addressed in assessment and plan.    Fall Risk Assessment    Has the patient had two or more falls in the last year or any fall with injury in the last year?  No    Safety Assessment    Throw rugs on floor.  Yes  Handrails on all stairs.  Yes  Good lighting in all hallways.  Yes  Difficulty hearing.  No  Patient counseled about all safety risks that were identified.    Functional Assessment ADLs    Are there any barriers preventing you from cooking for yourself or meeting nutritional needs?  No.    Are there any barriers preventing you from driving safely or obtaining transportation?  No.    Are there any barriers preventing you from using a telephone or calling for help?  No.    Are there any barriers preventing you from shopping?  No.    Are there any barriers preventing you from taking care of your own finances?  No.    Are there any barriers preventing you from managing your medications?  No.    Are there any barriers preventing you from showering, bathing or dressing yourself?  No.    Are you currently engaging in any exercise or physical activity?  Yes.     What is your perception of your health?  Excellent.      Health Maintenance Summary                IMM ZOSTER VACCINES Overdue  8/23/2012      Done 6/28/2012 Imm Admin: Zoster Vaccine Live (ZVL) (Zostavax)    MAMMOGRAM Overdue 12/18/2016      Done 12/18/2015 MA-SCREEN MAMMO W/CAD-BILAT    IMM INFLUENZA Overdue 9/1/2020      Done 10/10/2019 Imm Admin: Influenza Vaccine Adult HD     Patient has more history with this topic...    Annual Wellness Visit Overdue 9/16/2020      Done 9/16/2019      Patient has more history with this topic...    BONE DENSITY Next Due 10/26/2020      Done 10/26/2015 DS-BONE DENSITY STUDY (DEXA)     Patient has more history with this topic...    PAP SMEAR Next Due 9/25/2021      Done 9/25/2018 PATHOLOGY GYN SPECIMEN     Patient has more history with this topic...    COLONOSCOPY Next Due 10/18/2022      Done 10/18/2012     IMM DTaP/Tdap/Td Vaccine Next Due 9/12/2027      Done 9/12/2017 Imm Admin: Tdap Vaccine          Patient Care Team:  Slava Worthy M.D. as PCP - General (Family Medicine)  Fadi Najjar, M.D. as Consulting Physician (Nephrology)      1. Medicare annual wellness visit, subsequent      2. Need for vaccination    - INFLUENZA VACCINE, HIGH DOSE (65+ ONLY)  - Shingles Vaccine (Shingrix)    3. Chronic kidney disease, unspecified CKD stage  Improved  Patient is currently being followed for chronic renal insufficiency. They are avoiding nsaids and maintaining hydration and following renal diet. Taking all appropriate meds. No new change in urine frequency or volume.      4. Other hyperlipidemia  Currently treated for HLD, taking meds with no new myalgias or joint pain, watching fats in diet  controlled        5. GERD without esophagitis  Patient is currently being treated for gerd, taking meds with no new symptoms or side effects, is trying to modify diet with decreased acidic foods, caffeine, etoh.  controlled        Past Medical History:   Diagnosis Date   • Back pain     L4-5 disc that occasionally hurts   • GERD (gastroesophageal reflux disease)    • Hyperlipidemia      No past surgical history on  file.  Social History     Tobacco Use   • Smoking status: Former Smoker     Packs/day: 1.00     Years: 2.00     Pack years: 2.00     Types: Cigarettes     Quit date: 1967     Years since quittin.7   • Smokeless tobacco: Never Used   Substance Use Topics   • Alcohol use: No   • Drug use: No     Family History   Problem Relation Age of Onset   • Lung Disease Mother    • Diabetes Mother    • Cancer Mother    • Heart Disease Father    • Heart Disease Maternal Grandmother    • No Known Problems Maternal Grandfather    • No Known Problems Paternal Grandmother    • No Known Problems Paternal Grandfather          Current Outpatient Medications:   •  simvastatin (ZOCOR) 20 MG Tab, TAKE 1 TABLET BY MOUTH EVERY EVENING, Disp: 100 Tab, Rfl: 2  •  rabeprazole (ACIPHEX) 20 MG tablet, Take 1 Tab by mouth every day., Disp: 90 Tab, Rfl: 3  •  rabeprazole (ACIPHEX) 20 MG tablet, Take 1 Tab by mouth every day., Disp: 90 Tab, Rfl: 2  •  vitamin e (VITAMIN E) 400 UNIT Cap, Take 400 Units by mouth every day., Disp: , Rfl:   •  multivitamin (THERAGRAN) Tab, Take 1 Tab by mouth every day., Disp: , Rfl:   •  ascorbic acid (ASCORBIC ACID) 500 MG Tab, Take 500 mg by mouth every day., Disp: , Rfl:   •  Cholecalciferol (VITAMIN D-3 PO), Take  by mouth., Disp: , Rfl:   •  Iron Tab, Take  by mouth., Disp: , Rfl:   •  Omega-3 Fatty Acids (FISH OIL) 1000 MG Cap capsule, Take 1,000 mg by mouth 3 times a day, with meals., Disp: , Rfl:   •  Ginkgo Biloba (GINKOBA) 40 MG Tab, Take  by mouth., Disp: , Rfl:   •  LECITHIN PO, Take  by mouth., Disp: , Rfl:   •  aspirin (ASA) 81 MG Chew Tab chewable tablet, Take 81 mg by mouth every day., Disp: , Rfl:   •  Acetaminophen (TYLENOL 8 HOUR PO), Take  by mouth., Disp: , Rfl:   •  Ibuprofen (MOTRIN IB PO), Take  by mouth., Disp: , Rfl:     Patient was instructed on the use of medications, either prescriptions or OTC and informed on when the appropriate follow up time period should be. In addition, patient  was also instructed that should any acute worsening occur that they should notify this clinic asap or call 911.

## 2020-09-23 ENCOUNTER — HOSPITAL ENCOUNTER (OUTPATIENT)
Dept: LAB | Facility: MEDICAL CENTER | Age: 75
End: 2020-09-23
Attending: FAMILY MEDICINE
Payer: MEDICARE

## 2020-09-23 DIAGNOSIS — E78.49 OTHER HYPERLIPIDEMIA: ICD-10-CM

## 2020-09-23 DIAGNOSIS — Z00.00 MEDICARE ANNUAL WELLNESS VISIT, SUBSEQUENT: ICD-10-CM

## 2020-09-23 DIAGNOSIS — N18.9 CHRONIC KIDNEY DISEASE, UNSPECIFIED CKD STAGE: ICD-10-CM

## 2020-09-23 DIAGNOSIS — K21.9 GERD WITHOUT ESOPHAGITIS: ICD-10-CM

## 2020-09-23 DIAGNOSIS — Z23 NEED FOR VACCINATION: ICD-10-CM

## 2020-09-23 LAB
ALBUMIN SERPL BCP-MCNC: 4.7 G/DL (ref 3.2–4.9)
ALBUMIN/GLOB SERPL: 1.6 G/DL
ALP SERPL-CCNC: 80 U/L (ref 30–99)
ALT SERPL-CCNC: 34 U/L (ref 2–50)
ANION GAP SERPL CALC-SCNC: 14 MMOL/L (ref 7–16)
AST SERPL-CCNC: 35 U/L (ref 12–45)
BILIRUB SERPL-MCNC: 0.4 MG/DL (ref 0.1–1.5)
BUN SERPL-MCNC: 19 MG/DL (ref 8–22)
CALCIUM SERPL-MCNC: 9.8 MG/DL (ref 8.5–10.5)
CHLORIDE SERPL-SCNC: 99 MMOL/L (ref 96–112)
CHOLEST SERPL-MCNC: 169 MG/DL (ref 100–199)
CO2 SERPL-SCNC: 24 MMOL/L (ref 20–33)
CREAT SERPL-MCNC: 0.87 MG/DL (ref 0.5–1.4)
ERYTHROCYTE [DISTWIDTH] IN BLOOD BY AUTOMATED COUNT: 44.3 FL (ref 35.9–50)
FASTING STATUS PATIENT QL REPORTED: NORMAL
GLOBULIN SER CALC-MCNC: 3 G/DL (ref 1.9–3.5)
GLUCOSE SERPL-MCNC: 98 MG/DL (ref 65–99)
HCT VFR BLD AUTO: 39.8 % (ref 37–47)
HDLC SERPL-MCNC: 59 MG/DL
HGB BLD-MCNC: 13.1 G/DL (ref 12–16)
LDLC SERPL CALC-MCNC: 84 MG/DL
MCH RBC QN AUTO: 32.3 PG (ref 27–33)
MCHC RBC AUTO-ENTMCNC: 32.9 G/DL (ref 33.6–35)
MCV RBC AUTO: 98 FL (ref 81.4–97.8)
PLATELET # BLD AUTO: 227 K/UL (ref 164–446)
PMV BLD AUTO: 11.4 FL (ref 9–12.9)
POTASSIUM SERPL-SCNC: 4.4 MMOL/L (ref 3.6–5.5)
PROT SERPL-MCNC: 7.7 G/DL (ref 6–8.2)
RBC # BLD AUTO: 4.06 M/UL (ref 4.2–5.4)
SODIUM SERPL-SCNC: 137 MMOL/L (ref 135–145)
T3 SERPL-MCNC: 67.1 NG/DL (ref 60–181)
T4 FREE SERPL-MCNC: 1.14 NG/DL (ref 0.93–1.7)
TRIGL SERPL-MCNC: 129 MG/DL (ref 0–149)
WBC # BLD AUTO: 8.6 K/UL (ref 4.8–10.8)

## 2020-09-23 PROCEDURE — 80053 COMPREHEN METABOLIC PANEL: CPT

## 2020-09-23 PROCEDURE — 85027 COMPLETE CBC AUTOMATED: CPT

## 2020-09-23 PROCEDURE — 84480 ASSAY TRIIODOTHYRONINE (T3): CPT

## 2020-09-23 PROCEDURE — 36415 COLL VENOUS BLD VENIPUNCTURE: CPT

## 2020-09-23 PROCEDURE — 84439 ASSAY OF FREE THYROXINE: CPT

## 2020-09-23 PROCEDURE — 80061 LIPID PANEL: CPT

## 2020-09-24 ENCOUNTER — TELEPHONE (OUTPATIENT)
Dept: MEDICAL GROUP | Facility: PHYSICIAN GROUP | Age: 75
End: 2020-09-24

## 2020-09-24 NOTE — TELEPHONE ENCOUNTER
Phone Number Called: [phone] (home)      Call outcome: Did not leave a detailed message. Requested patient to call back.    Message: Left message for patient that labs are okay. And to call back if she has any questions.

## 2021-01-11 DIAGNOSIS — Z23 NEED FOR VACCINATION: ICD-10-CM

## 2021-01-28 DIAGNOSIS — K21.9 GERD WITHOUT ESOPHAGITIS: ICD-10-CM

## 2021-01-28 DIAGNOSIS — N18.9 CHRONIC KIDNEY DISEASE, UNSPECIFIED CKD STAGE: ICD-10-CM

## 2021-01-28 DIAGNOSIS — E78.2 MIXED HYPERLIPIDEMIA: ICD-10-CM

## 2021-01-28 RX ORDER — SIMVASTATIN 20 MG
TABLET ORAL
Qty: 100 TAB | Refills: 2 | Status: SHIPPED | OUTPATIENT
Start: 2021-01-28 | End: 2021-10-25

## 2021-04-28 DIAGNOSIS — E78.49 OTHER HYPERLIPIDEMIA: ICD-10-CM

## 2021-04-28 DIAGNOSIS — K21.9 GERD WITHOUT ESOPHAGITIS: ICD-10-CM

## 2021-04-28 RX ORDER — RABEPRAZOLE SODIUM 20 MG/1
TABLET, DELAYED RELEASE ORAL
Qty: 90 TABLET | Refills: 3 | Status: SHIPPED | OUTPATIENT
Start: 2021-04-28 | End: 2021-10-04

## 2021-09-08 ENCOUNTER — HOSPITAL ENCOUNTER (OUTPATIENT)
Dept: LAB | Facility: MEDICAL CENTER | Age: 76
End: 2021-09-08
Attending: FAMILY MEDICINE
Payer: COMMERCIAL

## 2021-09-08 DIAGNOSIS — E78.49 OTHER HYPERLIPIDEMIA: ICD-10-CM

## 2021-09-08 PROCEDURE — 36415 COLL VENOUS BLD VENIPUNCTURE: CPT

## 2021-09-08 PROCEDURE — 80053 COMPREHEN METABOLIC PANEL: CPT

## 2021-09-08 PROCEDURE — 80061 LIPID PANEL: CPT

## 2021-09-09 LAB
ALBUMIN SERPL BCP-MCNC: 4.7 G/DL (ref 3.2–4.9)
ALBUMIN/GLOB SERPL: 1.7 G/DL
ALP SERPL-CCNC: 66 U/L (ref 30–99)
ALT SERPL-CCNC: 23 U/L (ref 2–50)
ANION GAP SERPL CALC-SCNC: 14 MMOL/L (ref 7–16)
AST SERPL-CCNC: 28 U/L (ref 12–45)
BILIRUB SERPL-MCNC: 0.5 MG/DL (ref 0.1–1.5)
BUN SERPL-MCNC: 19 MG/DL (ref 8–22)
CALCIUM SERPL-MCNC: 9.9 MG/DL (ref 8.5–10.5)
CHLORIDE SERPL-SCNC: 104 MMOL/L (ref 96–112)
CHOLEST SERPL-MCNC: 184 MG/DL (ref 100–199)
CO2 SERPL-SCNC: 23 MMOL/L (ref 20–33)
CREAT SERPL-MCNC: 0.96 MG/DL (ref 0.5–1.4)
FASTING STATUS PATIENT QL REPORTED: NORMAL
GLOBULIN SER CALC-MCNC: 2.8 G/DL (ref 1.9–3.5)
GLUCOSE SERPL-MCNC: 101 MG/DL (ref 65–99)
HDLC SERPL-MCNC: 61 MG/DL
LDLC SERPL CALC-MCNC: 101 MG/DL
POTASSIUM SERPL-SCNC: 4.4 MMOL/L (ref 3.6–5.5)
PROT SERPL-MCNC: 7.5 G/DL (ref 6–8.2)
SODIUM SERPL-SCNC: 141 MMOL/L (ref 135–145)
TRIGL SERPL-MCNC: 109 MG/DL (ref 0–149)

## 2021-09-13 ENCOUNTER — APPOINTMENT (OUTPATIENT)
Dept: MEDICAL GROUP | Facility: PHYSICIAN GROUP | Age: 76
End: 2021-09-13
Payer: COMMERCIAL

## 2021-10-04 ENCOUNTER — OFFICE VISIT (OUTPATIENT)
Dept: MEDICAL GROUP | Facility: PHYSICIAN GROUP | Age: 76
End: 2021-10-04
Payer: COMMERCIAL

## 2021-10-04 VITALS
HEIGHT: 64 IN | SYSTOLIC BLOOD PRESSURE: 138 MMHG | DIASTOLIC BLOOD PRESSURE: 74 MMHG | OXYGEN SATURATION: 96 % | BODY MASS INDEX: 23.2 KG/M2 | RESPIRATION RATE: 16 BRPM | HEART RATE: 82 BPM | WEIGHT: 135.9 LBS | TEMPERATURE: 97.3 F

## 2021-10-04 DIAGNOSIS — K21.9 GERD WITHOUT ESOPHAGITIS: ICD-10-CM

## 2021-10-04 DIAGNOSIS — E78.49 OTHER HYPERLIPIDEMIA: ICD-10-CM

## 2021-10-04 DIAGNOSIS — Z78.0 POST-MENOPAUSAL: ICD-10-CM

## 2021-10-04 DIAGNOSIS — Z23 NEED FOR VACCINATION: ICD-10-CM

## 2021-10-04 DIAGNOSIS — Z00.00 MEDICARE ANNUAL WELLNESS VISIT, SUBSEQUENT: ICD-10-CM

## 2021-10-04 PROCEDURE — G0439 PPPS, SUBSEQ VISIT: HCPCS | Performed by: FAMILY MEDICINE

## 2021-10-04 PROCEDURE — 90662 IIV NO PRSV INCREASED AG IM: CPT | Performed by: FAMILY MEDICINE

## 2021-10-04 PROCEDURE — G0008 ADMIN INFLUENZA VIRUS VAC: HCPCS | Performed by: FAMILY MEDICINE

## 2021-10-04 RX ORDER — SIMVASTATIN 20 MG
20 TABLET ORAL
COMMUNITY
End: 2022-07-19 | Stop reason: SDUPTHER

## 2021-10-04 RX ORDER — CHLORAL HYDRATE 500 MG
CAPSULE ORAL
COMMUNITY
End: 2021-10-04

## 2021-10-04 ASSESSMENT — PATIENT HEALTH QUESTIONNAIRE - PHQ9: CLINICAL INTERPRETATION OF PHQ2 SCORE: 0

## 2021-10-04 ASSESSMENT — ENCOUNTER SYMPTOMS: GENERAL WELL-BEING: EXCELLENT

## 2021-10-04 ASSESSMENT — FIBROSIS 4 INDEX: FIB4 SCORE: 1.98

## 2021-10-04 ASSESSMENT — ACTIVITIES OF DAILY LIVING (ADL): BATHING_REQUIRES_ASSISTANCE: 0

## 2021-10-04 NOTE — PROGRESS NOTES
Chief Complaint   Patient presents with   • Annual Exam         HPI:  Aura is a 76 y.o. here for Medicare Annual Wellness Visit        Patient Active Problem List    Diagnosis Date Noted   • CKD (chronic kidney disease) 09/20/2016   • Other hyperlipidemia 10/15/2015   • GERD without esophagitis 10/15/2015       Current Outpatient Medications   Medication Sig Dispense Refill   • VITAMIN E PO N/A     • Ascorbic Acid (VITAMIN C PO) Take  by mouth.     • Ferrous Sulfate (IRON PO) Take  by mouth.     • Multiple Vitamins-Minerals (ECHINACEA ACZ PO) Take  by mouth.     • Coenzyme Q10 (COQ-10 PO) Take  by mouth.     • Calcium Carbonate-Vitamin D (CALCIUM 500/D PO) Take  by mouth.     • B Complex Vitamins (B COMPLEX 100 PO) Place  under the tongue.     • Fexofenadine HCl (ALLEGRA ALLERGY PO) Take  by mouth.     • simvastatin (ZOCOR) 20 MG Tab Take 20 mg by mouth.     • simvastatin (ZOCOR) 20 MG Tab TAKE 1 TABLET BY MOUTH EVERY EVENING 100 Tab 2   • rabeprazole (ACIPHEX) 20 MG tablet Take 1 Tab by mouth every day. 90 Tab 2   • vitamin e (VITAMIN E) 400 UNIT Cap Take 400 Units by mouth every day.     • multivitamin (THERAGRAN) Tab Take 1 Tab by mouth every day.     • Cholecalciferol (VITAMIN D-3 PO) Take  by mouth.     • Iron Tab Take  by mouth.     • Omega-3 Fatty Acids (FISH OIL) 1000 MG Cap capsule Take 1,000 mg by mouth 3 times a day, with meals.     • Ginkgo Biloba (GINKOBA) 40 MG Tab Take  by mouth.     • LECITHIN PO Take  by mouth.     • aspirin (ASA) 81 MG Chew Tab chewable tablet Take 81 mg by mouth every day.     • Acetaminophen (TYLENOL 8 HOUR PO) Take  by mouth.     • Ibuprofen (MOTRIN IB PO) Take  by mouth.     • HYDROCODONE-ACETAMINOPHEN PO Take  by mouth. (Patient not taking: Reported on 10/4/2021)     • MULTIPLE VITAMIN PO Take  by mouth. (Patient not taking: Reported on 10/4/2021)     • Cholecalciferol (VITAMIN D3 PO) Take  by mouth. (Patient not taking: Reported on 10/4/2021)     • Simvastatin (ZOCOR PO)  Take  by mouth. (Patient not taking: Reported on 10/4/2021)     • Acetaminophen (TYLENOL PO) Take  by mouth. (Patient not taking: Reported on 10/4/2021)     • Ibuprofen (MOTRIN IB PO) Take  by mouth. (Patient not taking: Reported on 10/4/2021)     • IRON, FERROUS SULFATE, PO Take  by mouth. (Patient not taking: Reported on 10/4/2021)     • Omega-3 Fatty Acids (FISH OIL) 1000 MG Cap capsule Take  by mouth. (Patient not taking: Reported on 10/4/2021)     • Aspirin Buf,CaCarb-MgCarb-MgO, 81 MG Tab Take 81 mg by mouth every day. (Patient not taking: Reported on 10/4/2021)     • ASPIRIN 81 PO Take  by mouth. (Patient not taking: Reported on 10/4/2021)     • RABEprazole Sodium (ACIPHEX PO) Take  by mouth. (Patient not taking: Reported on 10/4/2021)     • IBUPROFEN PO Take  by mouth. (Patient not taking: Reported on 10/4/2021)     • rabeprazole (ACIPHEX) 20 MG tablet TAKE 1 TABLET BY MOUTH EVERY DAY (Patient not taking: Reported on 10/4/2021) 90 tablet 3   • ascorbic acid (ASCORBIC ACID) 500 MG Tab Take 500 mg by mouth every day. (Patient not taking: Reported on 10/4/2021)       No current facility-administered medications for this visit.        Patient is taking medications as noted in medication list.  Current supplements as per medication list.     Allergies: Penicillins, Food, and Sulfa drugs    Current social contact/activities:      Is patient current with immunizations? Yes.    She  reports that she quit smoking about 54 years ago. Her smoking use included cigarettes. She has a 2.00 pack-year smoking history. She has never used smokeless tobacco. She reports that she does not drink alcohol and does not use drugs.  Counseling given: Not Answered        DPA/Advanced directive: Patient does not have an Advanced Directive.  A packet and workshop information was given on Advanced Directives.    ROS:    Gait: Uses no assistive device   Ostomy: No   Other tubes: No   Amputations: No   Chronic oxygen use No   Last eye exam  2020   Wears hearing aids: No   : Denies any urinary leakage during the last 6 months      Screening:        Depression Screening    Little interest or pleasure in doing things?  0 - not at all  Feeling down, depressed, or hopeless? 0 - not at all  Patient Health Questionnaire Score: 0    If depressive symptoms identified deferred to follow up visit unless specifically addressed in assessment and plan.    Interpretation of PHQ-9 Total Score   Score Severity   1-4 No Depression   5-9 Mild Depression   10-14 Moderate Depression   15-19 Moderately Severe Depression   20-27 Severe Depression    Screening for Cognitive Impairment    Three Minute Recall (captain, garden, picture)  3/3    Festus clock face with all 12 numbers and set the hands to show 5 past 8.  Yes    If cognitive concerns identified, deferred for follow up unless specifically addressed in assessment and plan.    Fall Risk Assessment    Has the patient had two or more falls in the last year or any fall with injury in the last year?  No  If fall risk identified, deferred for follow up unless specifically addressed in assessment and plan.    Safety Assessment    Throw rugs on floor.  Yes  Handrails on all stairs.  Yes  Good lighting in all hallways.  Yes  Difficulty hearing.  No  Patient counseled about all safety risks that were identified.    Functional Assessment ADLs    Are there any barriers preventing you from cooking for yourself or meeting nutritional needs?  No.    Are there any barriers preventing you from driving safely or obtaining transportation?  No.    Are there any barriers preventing you from using a telephone or calling for help?  No.    Are there any barriers preventing you from shopping?  No.    Are there any barriers preventing you from taking care of your own finances?  No.    Are there any barriers preventing you from managing your medications?  No.    Are there any barriers preventing you from showering, bathing or dressing  yourself?  No.    Are you currently engaging in any exercise or physical activity?  Yes.     What is your perception of your health?  Excellent.    Health Maintenance Summary                BONE DENSITY Overdue 10/26/2020      Done 10/26/2015 DS-BONE DENSITY STUDY (DEXA)     Patient has more history with this topic...    IMM ZOSTER VACCINES Overdue 2020      Done 2020 Imm Admin: Zoster Vaccine Recombinant (RZV) (SHINGRIX)     Patient has more history with this topic...    IMM INFLUENZA Overdue 2021      Done 2020 Imm Admin: Influenza Vaccine Adult HD     Patient has more history with this topic...    Annual Wellness Visit Overdue 2021      Done 2020 Visit Dx: Medicare annual wellness visit, subsequent     Patient has more history with this topic...    PAP SMEAR Overdue 2021      Done 2018      Patient has more history with this topic...    MAMMOGRAM Next Due 2021      Done 2020 [id number]     Patient has more history with this topic...    COLORECTAL CANCER SCREENING Next Due 10/18/2022     IMM DTaP/Tdap/Td Vaccine Next Due 2027      Done 2017 Imm Admin: Tdap Vaccine          Patient Care Team:  Slava Worthy M.D. as PCP - General (Family Medicine)  Fadi Najjar, M.D. as Consulting Physician (Nephrology)    Social History     Tobacco Use   • Smoking status: Former Smoker     Packs/day: 1.00     Years: 2.00     Pack years: 2.00     Types: Cigarettes     Quit date: 1967     Years since quittin.7   • Smokeless tobacco: Never Used   Substance Use Topics   • Alcohol use: No   • Drug use: No     Family History   Problem Relation Age of Onset   • Lung Disease Mother    • Diabetes Mother    • Cancer Mother    • Heart Disease Father    • Heart Disease Maternal Grandmother    • No Known Problems Maternal Grandfather    • No Known Problems Paternal Grandmother    • No Known Problems Paternal Grandfather      She  has a past medical history of  "Back pain, GERD (gastroesophageal reflux disease), and Hyperlipidemia.   No past surgical history on file.        Exam:     /74 (BP Location: Left arm, Patient Position: Sitting, BP Cuff Size: Adult)   Pulse 82   Temp 36.3 °C (97.3 °F) (Temporal)   Resp 16   Ht 1.626 m (5' 4\")   Wt 61.6 kg (135 lb 14.4 oz)   SpO2 96%  Body mass index is 23.33 kg/m².    Hearing excellent.    Dentition fair  Alert, oriented in no acute distress.  Eye contact is good, speech goal directed, affect calm      Assessment and Plan. The following treatment and monitoring plan is recommended:    1. Medicare annual wellness visit, subsequent      2. Need for vaccination    - INFLUENZA VACCINE, HIGH DOSE (65+ ONLY)    3. Post-menopausal    - DS-BONE DENSITY STUDY (DEXA)    4. Other hyperlipidemia  Currently treated for HLD, taking meds with no new myalgias or joint pain, watching fats in diet  controlled    Patient is currently being treated for gerd, taking meds with no new symptoms or side effects, is trying to modify diet with decreased acidic foods, caffeine, etoh.  controlled        5. GERD without esophagitis      Past Medical History:   Diagnosis Date   • Back pain     L4-5 disc that occasionally hurts   • GERD (gastroesophageal reflux disease)    • Hyperlipidemia      No past surgical history on file.  Social History     Tobacco Use   • Smoking status: Former Smoker     Packs/day: 1.00     Years: 2.00     Pack years: 2.00     Types: Cigarettes     Quit date: 1967     Years since quittin.7   • Smokeless tobacco: Never Used   Substance Use Topics   • Alcohol use: No   • Drug use: No     Family History   Problem Relation Age of Onset   • Lung Disease Mother    • Diabetes Mother    • Cancer Mother    • Heart Disease Father    • Heart Disease Maternal Grandmother    • No Known Problems Maternal Grandfather    • No Known Problems Paternal Grandmother    • No Known Problems Paternal Grandfather          Current Outpatient " Medications:   •  VITAMIN E PO, N/A, Disp: , Rfl:   •  Ascorbic Acid (VITAMIN C PO), Take  by mouth., Disp: , Rfl:   •  Ferrous Sulfate (IRON PO), Take  by mouth., Disp: , Rfl:   •  Multiple Vitamins-Minerals (ECHINACEA ACZ PO), Take  by mouth., Disp: , Rfl:   •  Coenzyme Q10 (COQ-10 PO), Take  by mouth., Disp: , Rfl:   •  Calcium Carbonate-Vitamin D (CALCIUM 500/D PO), Take  by mouth., Disp: , Rfl:   •  B Complex Vitamins (B COMPLEX 100 PO), Place  under the tongue., Disp: , Rfl:   •  Fexofenadine HCl (ALLEGRA ALLERGY PO), Take  by mouth., Disp: , Rfl:   •  simvastatin (ZOCOR) 20 MG Tab, Take 20 mg by mouth., Disp: , Rfl:   •  simvastatin (ZOCOR) 20 MG Tab, TAKE 1 TABLET BY MOUTH EVERY EVENING, Disp: 100 Tab, Rfl: 2  •  rabeprazole (ACIPHEX) 20 MG tablet, Take 1 Tab by mouth every day., Disp: 90 Tab, Rfl: 2  •  vitamin e (VITAMIN E) 400 UNIT Cap, Take 400 Units by mouth every day., Disp: , Rfl:   •  multivitamin (THERAGRAN) Tab, Take 1 Tab by mouth every day., Disp: , Rfl:   •  Cholecalciferol (VITAMIN D-3 PO), Take  by mouth., Disp: , Rfl:   •  Iron Tab, Take  by mouth., Disp: , Rfl:   •  Omega-3 Fatty Acids (FISH OIL) 1000 MG Cap capsule, Take 1,000 mg by mouth 3 times a day, with meals., Disp: , Rfl:   •  Ginkgo Biloba (GINKOBA) 40 MG Tab, Take  by mouth., Disp: , Rfl:   •  LECITHIN PO, Take  by mouth., Disp: , Rfl:   •  aspirin (ASA) 81 MG Chew Tab chewable tablet, Take 81 mg by mouth every day., Disp: , Rfl:   •  Acetaminophen (TYLENOL 8 HOUR PO), Take  by mouth., Disp: , Rfl:   •  Ibuprofen (MOTRIN IB PO), Take  by mouth., Disp: , Rfl:     Patient was instructed on the use of medications, either prescriptions or OTC and informed on when the appropriate follow up time period should be. In addition, patient was also instructed that should any acute worsening occur that they should notify this clinic asap or call 911.          Services suggested: No services needed at this time  Health Care Screening  recommendations as per orders if indicated.  Referrals offered: PT/OT/Nutrition counseling/Behavioral Health/Smoking cessation as per orders if indicated.    Discussion today about general wellness and lifestyle habits:    · Prevent falls and reduce trip hazards; Cautioned about securing or removing rugs.  · Have a working fire alarm and carbon monoxide detector;   · Engage in regular physical activity and social activities       Follow-up: No follow-ups on file.

## 2021-10-25 DIAGNOSIS — N18.9 CHRONIC KIDNEY DISEASE, UNSPECIFIED CKD STAGE: ICD-10-CM

## 2021-10-25 DIAGNOSIS — K21.9 GERD WITHOUT ESOPHAGITIS: ICD-10-CM

## 2021-10-25 DIAGNOSIS — E78.2 MIXED HYPERLIPIDEMIA: ICD-10-CM

## 2021-10-25 RX ORDER — SIMVASTATIN 20 MG
TABLET ORAL
Qty: 100 TABLET | Refills: 2 | Status: SHIPPED | OUTPATIENT
Start: 2021-10-25

## 2021-10-27 RX ORDER — GABAPENTIN 300 MG/1
300 CAPSULE ORAL 3 TIMES DAILY
COMMUNITY
End: 2021-10-27 | Stop reason: SDUPTHER

## 2021-10-27 NOTE — TELEPHONE ENCOUNTER
Last seen:8/16/21 by Dr. Duarte  Next appt: none    Was the patient seen in the last year in this department? Yes   Does patient have an active prescription for medications requested? No   Received Request Via: Pharmacy

## 2021-10-28 RX ORDER — GABAPENTIN 300 MG/1
300 CAPSULE ORAL 3 TIMES DAILY
Qty: 90 CAPSULE | Refills: 3 | Status: SHIPPED | OUTPATIENT
Start: 2021-10-28

## 2022-01-07 ENCOUNTER — TELEPHONE (OUTPATIENT)
Dept: INTERNAL MEDICINE | Facility: OTHER | Age: 77
End: 2022-01-07

## 2022-01-07 NOTE — TELEPHONE ENCOUNTER
Received form from pharmacy stating Symbicort has a cost savign generic alternative, they are requesting for the medication to be resent as the generic.

## 2022-01-12 RX ORDER — BUDESONIDE AND FORMOTEROL FUMARATE DIHYDRATE 160; 4.5 UG/1; UG/1
2 AEROSOL RESPIRATORY (INHALATION) 2 TIMES DAILY
Qty: 3 EACH | Refills: 3 | Status: SHIPPED | OUTPATIENT
Start: 2022-01-12

## 2022-01-21 ENCOUNTER — TELEPHONE (OUTPATIENT)
Dept: INTERNAL MEDICINE | Facility: OTHER | Age: 77
End: 2022-01-21

## 2022-01-21 NOTE — TELEPHONE ENCOUNTER
VOICEMAIL  1. Caller Name: Aura Ospina                      Call Back Number: [phone]    2. Message: patient called and lm wanting to know her covid and flu results.    3. Patient approves office to leave a detailed voicemail/MyChart message: yes    01/21/22 3:08 PM    called and lm for the patient informing her we do not have any of those results and if  was not the ordering provider she would need to contact the provider who ordered them.pt to call back.

## 2022-01-31 ENCOUNTER — TELEPHONE (OUTPATIENT)
Dept: INTERNAL MEDICINE | Facility: OTHER | Age: 77
End: 2022-01-31

## 2022-01-31 NOTE — TELEPHONE ENCOUNTER
Received paperwork for patient to get orthopedic inserts. Paperwork is at my desk to be filled out.

## 2022-02-01 NOTE — TELEPHONE ENCOUNTER
Please have the paperwork to another resident since I will be there in 1 week. Patient may need the paperwork filled up ASAP.

## 2022-04-29 DIAGNOSIS — E78.2 MIXED HYPERLIPIDEMIA: ICD-10-CM

## 2022-04-29 DIAGNOSIS — N18.1 STAGE 1 CHRONIC KIDNEY DISEASE: ICD-10-CM

## 2022-04-29 RX ORDER — RABEPRAZOLE SODIUM 20 MG/1
20 TABLET, DELAYED RELEASE ORAL
Qty: 90 TABLET | Refills: 2 | Status: SHIPPED | OUTPATIENT
Start: 2022-04-29 | End: 2023-01-18

## 2022-05-02 ENCOUNTER — TELEPHONE (OUTPATIENT)
Dept: MEDICAL GROUP | Facility: PHYSICIAN GROUP | Age: 77
End: 2022-05-02
Payer: COMMERCIAL

## 2022-07-19 DIAGNOSIS — N18.9 CHRONIC KIDNEY DISEASE, UNSPECIFIED CKD STAGE: ICD-10-CM

## 2022-07-19 DIAGNOSIS — E78.2 MIXED HYPERLIPIDEMIA: ICD-10-CM

## 2022-07-19 RX ORDER — SIMVASTATIN 20 MG
20 TABLET ORAL NIGHTLY
Qty: 90 TABLET | Refills: 3 | Status: SHIPPED | OUTPATIENT
Start: 2022-07-19 | End: 2023-07-11 | Stop reason: SDUPTHER

## 2022-09-15 ENCOUNTER — TELEPHONE (OUTPATIENT)
Dept: MEDICAL GROUP | Facility: PHYSICIAN GROUP | Age: 77
End: 2022-09-15
Payer: COMMERCIAL

## 2022-10-11 ENCOUNTER — OFFICE VISIT (OUTPATIENT)
Dept: MEDICAL GROUP | Facility: PHYSICIAN GROUP | Age: 77
End: 2022-10-11
Payer: COMMERCIAL

## 2022-10-11 VITALS
DIASTOLIC BLOOD PRESSURE: 72 MMHG | OXYGEN SATURATION: 96 % | WEIGHT: 138 LBS | TEMPERATURE: 97.5 F | SYSTOLIC BLOOD PRESSURE: 118 MMHG | HEART RATE: 75 BPM | HEIGHT: 64 IN | BODY MASS INDEX: 23.56 KG/M2 | RESPIRATION RATE: 12 BRPM

## 2022-10-11 DIAGNOSIS — N18.31 CHRONIC KIDNEY DISEASE, STAGE 3A: ICD-10-CM

## 2022-10-11 DIAGNOSIS — Z23 NEED FOR VACCINATION: ICD-10-CM

## 2022-10-11 DIAGNOSIS — Z12.11 SCREENING FOR COLORECTAL CANCER: ICD-10-CM

## 2022-10-11 DIAGNOSIS — Z12.12 SCREENING FOR COLORECTAL CANCER: ICD-10-CM

## 2022-10-11 DIAGNOSIS — E78.49 OTHER HYPERLIPIDEMIA: ICD-10-CM

## 2022-10-11 DIAGNOSIS — Z00.00 MEDICARE ANNUAL WELLNESS VISIT, SUBSEQUENT: ICD-10-CM

## 2022-10-11 PROCEDURE — 90662 IIV NO PRSV INCREASED AG IM: CPT | Performed by: FAMILY MEDICINE

## 2022-10-11 PROCEDURE — G0008 ADMIN INFLUENZA VIRUS VAC: HCPCS | Performed by: FAMILY MEDICINE

## 2022-10-11 PROCEDURE — G0439 PPPS, SUBSEQ VISIT: HCPCS | Performed by: FAMILY MEDICINE

## 2022-10-11 ASSESSMENT — ACTIVITIES OF DAILY LIVING (ADL): BATHING_REQUIRES_ASSISTANCE: 0

## 2022-10-11 ASSESSMENT — ENCOUNTER SYMPTOMS: GENERAL WELL-BEING: EXCELLENT

## 2022-10-11 ASSESSMENT — PATIENT HEALTH QUESTIONNAIRE - PHQ9: CLINICAL INTERPRETATION OF PHQ2 SCORE: 0

## 2022-10-11 NOTE — PROGRESS NOTES
Chief Complaint   Patient presents with    Annual Exam         HPI:  Aura is a 77 y.o. here for Medicare Annual Wellness Visit        Patient Active Problem List    Diagnosis Date Noted    Other hyperlipidemia 10/15/2015    GERD without esophagitis 10/15/2015       Current Outpatient Medications   Medication Sig Dispense Refill    Apoaequorin 10 MG Cap Take  by mouth.      rabeprazole (ACIPHEX) 20 MG tablet Take 1 Tablet by mouth every day. 90 Tablet 2    simvastatin (ZOCOR) 20 MG Tab TAKE 1 TABLET BY MOUTH EVERY EVENING 100 Tablet 2    Ascorbic Acid (VITAMIN C PO) Take  by mouth.      Ferrous Sulfate (IRON PO) Take  by mouth.      Multiple Vitamins-Minerals (ECHINACEA ACZ PO) Take  by mouth.      Coenzyme Q10 (COQ-10 PO) Take  by mouth.      Calcium Carbonate-Vitamin D (CALCIUM 500/D PO) Take  by mouth.      B Complex Vitamins (B COMPLEX 100 PO) Place  under the tongue.      Fexofenadine HCl (ALLEGRA ALLERGY PO) Take  by mouth.      vitamin e (VITAMIN E) 400 UNIT Cap Take 400 Units by mouth every day.      multivitamin (THERAGRAN) Tab Take 1 Tab by mouth every day.      Cholecalciferol (VITAMIN D-3 PO) Take  by mouth.      Omega-3 Fatty Acids (FISH OIL) 1000 MG Cap capsule Take 1,000 mg by mouth 3 times a day, with meals.      Ginkgo Biloba 40 MG Tab Take  by mouth.      LECITHIN PO Take  by mouth.      aspirin (ASA) 81 MG Chew Tab chewable tablet Take 81 mg by mouth every day.      Acetaminophen (TYLENOL 8 HOUR PO) Take  by mouth.      Ibuprofen (MOTRIN IB PO) Take  by mouth.      simvastatin (ZOCOR) 20 MG Tab Take 1 Tablet by mouth every evening. (Patient not taking: Reported on 10/11/2022) 90 Tablet 3    budesonide-formoterol (SYMBICORT) 160-4.5 MCG/ACT Aerosol Inhale 2 Puffs 2 times a day. (Patient not taking: Reported on 10/11/2022) 3 Each 3    gabapentin (NEURONTIN) 300 MG Cap Take 1 Capsule by mouth 3 times a day. (Patient not taking: Reported on 10/11/2022) 90 Capsule 3    VITAMIN E PO N/A (Patient not  taking: Reported on 10/11/2022)      Iron Tab Take  by mouth. (Patient not taking: Reported on 10/11/2022)       No current facility-administered medications for this visit.        Patient is taking medications as noted in medication list.  Current supplements as per medication list.     Allergies: Penicillins, Food, and Sulfa drugs    Current social contact/activities:      Is patient current with immunizations? Yes.    She  reports that she quit smoking about 55 years ago. Her smoking use included cigarettes. She has a 2.00 pack-year smoking history. She has never used smokeless tobacco. She reports that she does not drink alcohol and does not use drugs.  Counseling given: Not Answered      ROS:   Gait: Uses no assistive device   Ostomy: No   Other tubes: No   Amputations: No   Chronic oxygen use No   Last eye exam 2021   Wears hearing aids: No   : Denies any urinary leakage during the last 6 months      Screening:        Depression Screening  Little interest or pleasure in doing things?  0 - not at all  Feeling down, depressed, or hopeless? 0 - not at all  Patient Health Questionnaire Score: 0    If depressive symptoms identified deferred to follow up visit unless specifically addressed in assessment and plan.    Interpretation of PHQ-9 Total Score   Score Severity   1-4 No Depression   5-9 Mild Depression   10-14 Moderate Depression   15-19 Moderately Severe Depression   20-27 Severe Depression    Screening for Cognitive Impairment  Three Minute Recall (daughter, heaven, mountain)  3/3    Festus clock face with all 12 numbers and set the hands to show 10 past 11.  Yes    If cognitive concerns identified, deferred for follow up unless specifically addressed in assessment and plan.    Fall Risk Assessment  Has the patient had two or more falls in the last year or any fall with injury in the last year?  No  If fall risk identified, deferred for follow up unless specifically addressed in assessment and  plan.    Safety Assessment  Throw rugs on floor.  Yes  Handrails on all stairs.  Yes  Good lighting in all hallways.  Yes  Difficulty hearing.  No  Patient counseled about all safety risks that were identified.    Functional Assessment ADLs  Are there any barriers preventing you from cooking for yourself or meeting nutritional needs?  No.    Are there any barriers preventing you from driving safely or obtaining transportation?  No.    Are there any barriers preventing you from using a telephone or calling for help?  No.    Are there any barriers preventing you from shopping?  No.    Are there any barriers preventing you from taking care of your own finances?  No.    Are there any barriers preventing you from managing your medications?  No.    Are there any barriers preventing you from showering, bathing or dressing yourself?  No.    Are you currently engaging in any exercise or physical activity?  Yes.     What is your perception of your health?  Excellent.    Advance Care Planning  Do you have an Advance Directive, Living Will, Durable Power of , or POLST? Yes.    Health Maintenance Summary            Overdue - IMM HEP B VACCINE (1 of 3 - 3-dose series) Overdue - never done      No completion history exists for this topic.              Overdue - COVID-19 Vaccine (4 - Booster for Moderna series) Overdue since 3/24/2022      11/24/2021  Imm Admin: MODERNA SARS-COV-2 VACCINE (12+)    03/24/2021  Imm Admin: Moderna SARS-CoV-2 Vaccine    02/24/2021  Imm Admin: Moderna SARS-CoV-2 Vaccine              Overdue - IMM INFLUENZA (1) Order placed this encounter      10/04/2021  Imm Admin: Influenza Vaccine Adult HD    09/21/2020  Imm Admin: Influenza Vaccine Adult HD    10/10/2019  Imm Admin: Influenza Vaccine Adult HD    09/13/2018  Imm Admin: Influenza Vaccine Adult HD    09/12/2017  Imm Admin: Influenza Vaccine Adult HD    Only the first 5 history entries have been loaded, but more history exists.               COLORECTAL CANCER SCREENING (COLONOSCOPY - Every 10 Years) Due soon on 10/18/2022      10/18/2012  COLONOSCOPY (Done)              Postponed - IMM ZOSTER VACCINES (3 of 3) Postponed until 3/11/2023      09/21/2020  Imm Admin: Zoster Vaccine Recombinant (RZV) (SHINGRIX)    06/28/2012  Imm Admin: Zoster Vaccine Live (ZVL) (Zostavax) - HISTORICAL DATA              MAMMOGRAM (Yearly) Tentatively due on 12/29/2022 12/29/2021  MA-SCREENING MAMMO BILAT W/TOMOSYNTHESIS W/CAD    12/29/2021  YN-SOWTKMODG-DFDSESURO    12/16/2020  VH-YBYZOCPWJ-REURDJOYZ    12/16/2020  HS-MELNDIIEJ-PITMWZDSH    12/12/2019  Done    Only the first 5 history entries have been loaded, but more history exists.              BONE DENSITY (Every 5 Years) Tentatively due on 1/18/2027 01/18/2022  DS-BONE DENSITY STUDY (DEXA)    01/18/2022  DS-BONE DENSITY STUDY (DEXA)    10/26/2015  DS-BONE DENSITY STUDY (DEXA)    05/01/2009  Reason not specified              IMM DTaP/Tdap/Td Vaccine (2 - Td or Tdap) Next due on 9/12/2027 09/12/2017  Imm Admin: Tdap Vaccine              IMM PNEUMOCOCCAL VACCINE: 65+ Years (Series Information) Completed      09/13/2018  Imm Admin: Pneumococcal polysaccharide vaccine (PPSV-23)    09/12/2017  Imm Admin: Pneumococcal Conjugate Vaccine (Prevnar/PCV-13)    06/29/2015  Imm Admin: Pneumococcal Conjugate Vaccine (Prevnar/PCV-13)              IMM MENINGOCOCCAL ACWY VACCINE (Series Information) Aged Out      No completion history exists for this topic.              Discontinued - PAP SMEAR  Discontinued      09/25/2018  Done    09/25/2018  THINPREP PAP W/HPV AND CTNG    09/25/2018  PATHOLOGY GYN SPECIMEN                    Patient Care Team:  Slava Worthy M.D. as PCP - General (Family Medicine)  Fadi Najjar, M.D. as Consulting Physician (Nephrology)    Social History     Tobacco Use    Smoking status: Former     Packs/day: 1.00     Years: 2.00     Pack years: 2.00     Types: Cigarettes     Quit date: 01/1967      "Years since quittin.8    Smokeless tobacco: Never   Substance Use Topics    Alcohol use: No    Drug use: No     Family History   Problem Relation Age of Onset    Lung Disease Mother     Diabetes Mother     Cancer Mother     Heart Disease Father     Heart Disease Maternal Grandmother     No Known Problems Maternal Grandfather     No Known Problems Paternal Grandmother     No Known Problems Paternal Grandfather      She  has a past medical history of Back pain, GERD (gastroesophageal reflux disease), and Hyperlipidemia.   No past surgical history on file.        Exam:     /72 (BP Location: Left arm, Patient Position: Sitting, BP Cuff Size: Adult)   Pulse 75   Temp 36.4 °C (97.5 °F) (Temporal)   Resp 12   Ht 1.626 m (5' 4\")   Wt 62.6 kg (138 lb)   SpO2 96%  Body mass index is 23.69 kg/m².    Hearing good.    Dentition good  Alert, oriented in no acute distress.  Eye contact is good, speech goal directed, affect calm      Assessment and Plan. The following treatment and monitoring plan is recommended:    1. Need for vaccination    - Influenza Vaccine, High Dose (65+ Only)    2. Medicare annual wellness visit, subsequent  Utd on     3. Other hyperlipidemia  Currently treated for HLD, taking meds with no new myalgias or joint pain, watching fats in diet  controlled    4. Chronic kidney disease, stage 3a (HCC)  Patient is currently being followed for chronic renal insufficiency. They are avoiding nsaids and maintaining hydration and following renal diet. Taking all appropriate meds. No new change in urine frequency or volume.    5. Screening for colorectal cancer    - OCCULT BLOOD FECES IMMUNOASSAY (FIT); Future    Past Medical History:   Diagnosis Date    Back pain     L4-5 disc that occasionally hurts    GERD (gastroesophageal reflux disease)     Hyperlipidemia      History reviewed. No pertinent surgical history.    Social History     Tobacco Use    Smoking status: Former     Packs/day: 1.00     " Years: 2.00     Pack years: 2.00     Types: Cigarettes     Quit date: 1967     Years since quittin.8    Smokeless tobacco: Never   Substance Use Topics    Alcohol use: No    Drug use: No       Family History   Problem Relation Age of Onset    Lung Disease Mother     Diabetes Mother     Cancer Mother     Heart Disease Father     Heart Disease Maternal Grandmother     No Known Problems Maternal Grandfather     No Known Problems Paternal Grandmother     No Known Problems Paternal Grandfather          Current Outpatient Medications:     Apoaequorin 10 MG Cap, Take  by mouth., Disp: , Rfl:     rabeprazole (ACIPHEX) 20 MG tablet, Take 1 Tablet by mouth every day., Disp: 90 Tablet, Rfl: 2    simvastatin (ZOCOR) 20 MG Tab, TAKE 1 TABLET BY MOUTH EVERY EVENING, Disp: 100 Tablet, Rfl: 2    Ascorbic Acid (VITAMIN C PO), Take  by mouth., Disp: , Rfl:     Ferrous Sulfate (IRON PO), Take  by mouth., Disp: , Rfl:     Multiple Vitamins-Minerals (ECHINACEA ACZ PO), Take  by mouth., Disp: , Rfl:     Coenzyme Q10 (COQ-10 PO), Take  by mouth., Disp: , Rfl:     Calcium Carbonate-Vitamin D (CALCIUM 500/D PO), Take  by mouth., Disp: , Rfl:     B Complex Vitamins (B COMPLEX 100 PO), Place  under the tongue., Disp: , Rfl:     Fexofenadine HCl (ALLEGRA ALLERGY PO), Take  by mouth., Disp: , Rfl:     vitamin e (VITAMIN E) 400 UNIT Cap, Take 400 Units by mouth every day., Disp: , Rfl:     multivitamin (THERAGRAN) Tab, Take 1 Tab by mouth every day., Disp: , Rfl:     Cholecalciferol (VITAMIN D-3 PO), Take  by mouth., Disp: , Rfl:     Omega-3 Fatty Acids (FISH OIL) 1000 MG Cap capsule, Take 1,000 mg by mouth 3 times a day, with meals., Disp: , Rfl:     Ginkgo Biloba 40 MG Tab, Take  by mouth., Disp: , Rfl:     LECITHIN PO, Take  by mouth., Disp: , Rfl:     aspirin (ASA) 81 MG Chew Tab chewable tablet, Take 81 mg by mouth every day., Disp: , Rfl:     Acetaminophen (TYLENOL 8 HOUR PO), Take  by mouth., Disp: , Rfl:     Ibuprofen (MOTRIN IB  PO), Take  by mouth., Disp: , Rfl:     simvastatin (ZOCOR) 20 MG Tab, Take 1 Tablet by mouth every evening. (Patient not taking: Reported on 10/11/2022), Disp: 90 Tablet, Rfl: 3    budesonide-formoterol (SYMBICORT) 160-4.5 MCG/ACT Aerosol, Inhale 2 Puffs 2 times a day. (Patient not taking: Reported on 10/11/2022), Disp: 3 Each, Rfl: 3    gabapentin (NEURONTIN) 300 MG Cap, Take 1 Capsule by mouth 3 times a day. (Patient not taking: Reported on 10/11/2022), Disp: 90 Capsule, Rfl: 3    VITAMIN E PO, N/A (Patient not taking: Reported on 10/11/2022), Disp: , Rfl:     Iron Tab, Take  by mouth. (Patient not taking: Reported on 10/11/2022), Disp: , Rfl:     Patient was instructed on the use of medications, either prescriptions or OTC and informed on when the appropriate follow up time period should be. In addition, patient was also instructed that should any acute worsening occur that they should notify this clinic asap or call 911.          Services suggested: No services needed at this time  Health Care Screening recommendations as per orders if indicated.  Referrals offered: PT/OT/Nutrition counseling/Behavioral Health/Smoking cessation as per orders if indicated.    Discussion today about general wellness and lifestyle habits:    Prevent falls and reduce trip hazards; Cautioned about securing or removing rugs.  Have a working fire alarm and carbon monoxide detector;   Engage in regular physical activity and social activities       Follow-up: No follow-ups on file.

## 2022-11-09 ENCOUNTER — PATIENT MESSAGE (OUTPATIENT)
Dept: HEALTH INFORMATION MANAGEMENT | Facility: OTHER | Age: 77
End: 2022-11-09

## 2022-11-10 ENCOUNTER — HOSPITAL ENCOUNTER (OUTPATIENT)
Facility: MEDICAL CENTER | Age: 77
End: 2022-11-10
Attending: FAMILY MEDICINE
Payer: COMMERCIAL

## 2022-11-10 PROCEDURE — 82274 ASSAY TEST FOR BLOOD FECAL: CPT

## 2022-11-17 DIAGNOSIS — Z12.11 SCREENING FOR COLORECTAL CANCER: ICD-10-CM

## 2022-11-17 DIAGNOSIS — Z12.12 SCREENING FOR COLORECTAL CANCER: ICD-10-CM

## 2022-11-19 LAB — IMM ASSAY OCC BLD FITOB: NEGATIVE

## 2023-01-18 DIAGNOSIS — N18.1 STAGE 1 CHRONIC KIDNEY DISEASE: ICD-10-CM

## 2023-01-18 DIAGNOSIS — E78.2 MIXED HYPERLIPIDEMIA: ICD-10-CM

## 2023-01-18 RX ORDER — RABEPRAZOLE SODIUM 20 MG/1
20 TABLET, DELAYED RELEASE ORAL
Qty: 90 TABLET | Refills: 2 | Status: SHIPPED | OUTPATIENT
Start: 2023-01-18 | End: 2023-10-05

## 2023-07-10 DIAGNOSIS — N18.31 CHRONIC KIDNEY DISEASE, STAGE 3A: ICD-10-CM

## 2023-07-10 DIAGNOSIS — E78.2 MIXED HYPERLIPIDEMIA: ICD-10-CM

## 2023-07-11 RX ORDER — SIMVASTATIN 20 MG
20 TABLET ORAL NIGHTLY
Qty: 90 TABLET | Refills: 3 | Status: SHIPPED | OUTPATIENT
Start: 2023-07-11

## 2023-09-05 ENCOUNTER — TELEPHONE (OUTPATIENT)
Dept: MEDICAL GROUP | Facility: PHYSICIAN GROUP | Age: 78
End: 2023-09-05
Payer: MEDICARE

## 2023-10-05 DIAGNOSIS — N18.1 STAGE 1 CHRONIC KIDNEY DISEASE: ICD-10-CM

## 2023-10-05 DIAGNOSIS — E78.2 MIXED HYPERLIPIDEMIA: ICD-10-CM

## 2023-10-05 RX ORDER — RABEPRAZOLE SODIUM 20 MG/1
20 TABLET, DELAYED RELEASE ORAL
Qty: 90 TABLET | Refills: 2 | Status: SHIPPED | OUTPATIENT
Start: 2023-10-05

## 2023-10-16 ENCOUNTER — APPOINTMENT (OUTPATIENT)
Dept: MEDICAL GROUP | Facility: PHYSICIAN GROUP | Age: 78
End: 2023-10-16
Payer: MEDICARE

## 2023-10-23 ENCOUNTER — OFFICE VISIT (OUTPATIENT)
Dept: MEDICAL GROUP | Facility: PHYSICIAN GROUP | Age: 78
End: 2023-10-23
Payer: MEDICARE

## 2023-10-23 VITALS
RESPIRATION RATE: 16 BRPM | BODY MASS INDEX: 23.22 KG/M2 | DIASTOLIC BLOOD PRESSURE: 64 MMHG | HEIGHT: 64 IN | OXYGEN SATURATION: 96 % | HEART RATE: 71 BPM | SYSTOLIC BLOOD PRESSURE: 138 MMHG | TEMPERATURE: 98.3 F | WEIGHT: 136 LBS

## 2023-10-23 DIAGNOSIS — Z00.00 MEDICARE ANNUAL WELLNESS VISIT, SUBSEQUENT: ICD-10-CM

## 2023-10-23 DIAGNOSIS — Z23 NEED FOR VACCINATION: ICD-10-CM

## 2023-10-23 DIAGNOSIS — E78.2 MIXED HYPERLIPIDEMIA: ICD-10-CM

## 2023-10-23 PROCEDURE — 3078F DIAST BP <80 MM HG: CPT | Performed by: FAMILY MEDICINE

## 2023-10-23 PROCEDURE — 90662 IIV NO PRSV INCREASED AG IM: CPT | Performed by: FAMILY MEDICINE

## 2023-10-23 PROCEDURE — G0008 ADMIN INFLUENZA VIRUS VAC: HCPCS | Performed by: FAMILY MEDICINE

## 2023-10-23 PROCEDURE — G0439 PPPS, SUBSEQ VISIT: HCPCS | Mod: 25 | Performed by: FAMILY MEDICINE

## 2023-10-23 PROCEDURE — 3075F SYST BP GE 130 - 139MM HG: CPT | Performed by: FAMILY MEDICINE

## 2023-10-23 RX ORDER — HYDROCODONE BITARTRATE AND ACETAMINOPHEN 10; 325 MG/1; MG/1
TABLET ORAL
COMMUNITY

## 2023-10-23 RX ORDER — ASCORBATE CALCIUM 500 MG
TABLET ORAL
COMMUNITY

## 2023-10-23 RX ORDER — SIMVASTATIN 20 MG
1 TABLET ORAL EVERY EVENING
COMMUNITY
End: 2023-10-23

## 2023-10-23 RX ORDER — ASCORBIC ACID 1000 MG
40 TABLET ORAL
COMMUNITY

## 2023-10-23 RX ORDER — RABEPRAZOLE SODIUM 20 MG/1
1 TABLET, DELAYED RELEASE ORAL
COMMUNITY
End: 2023-10-23

## 2023-10-23 RX ORDER — CHLORAL HYDRATE 500 MG
1 CAPSULE ORAL 2 TIMES DAILY
COMMUNITY

## 2023-10-23 RX ORDER — ACETAMINOPHEN 325 MG/1
325 TABLET ORAL
COMMUNITY

## 2023-10-23 RX ORDER — FEXOFENADINE HCL 60 MG/1
TABLET, FILM COATED ORAL DAILY
COMMUNITY

## 2023-10-23 ASSESSMENT — FIBROSIS 4 INDEX: FIB4 SCORE: 1.5

## 2023-10-23 ASSESSMENT — ENCOUNTER SYMPTOMS: GENERAL WELL-BEING: EXCELLENT

## 2023-10-23 ASSESSMENT — PATIENT HEALTH QUESTIONNAIRE - PHQ9: CLINICAL INTERPRETATION OF PHQ2 SCORE: 0

## 2023-10-23 ASSESSMENT — ACTIVITIES OF DAILY LIVING (ADL): BATHING_REQUIRES_ASSISTANCE: 0

## 2023-10-23 NOTE — PROGRESS NOTES
Chief Complaint   Patient presents with    Annual Exam    Lab Results     In media        HPI:  Aura Ospina is a 78 y.o. here for Medicare Annual Wellness Visit     Patient Active Problem List    Diagnosis Date Noted    Other hyperlipidemia 10/15/2015    GERD without esophagitis 10/15/2015       Current Outpatient Medications   Medication Sig Dispense Refill    acetaminophen (TYLENOL) 325 MG Tab Take 325 mg by mouth.      Vitamin E 100 units Tab Take  by mouth.      Apoaequorin (PREVAGEN) 10 MG Cap Take 10 mg by mouth.      fexofenadine (ALLEGRA) 60 MG Tab Take  by mouth every day.      Ginkgo Biloba 40 MG Tab Take 40 mg by mouth.      Ferrous Sulfate 50 MG Tab CR Take 1 Tablet by mouth every day.      cyanocobalamin 100 MCG tablet Take 100 mcg by mouth every day.      Calcium Carbonate 1500 (600 Ca) MG Tab Take 1 Tablet by mouth 2 times a day.      Omega-3 Fatty Acids (FISH OIL) 1000 MG Cap capsule Take 1 Tablet by mouth 2 times a day.      ASCORBIC ACID PO Take  by mouth.      COENZYME Q10 PO Take  by mouth.      Multiple Vitamins-Minerals (OCUVITE ADULT 50+ PO) Take  by mouth.      rabeprazole (ACIPHEX) 20 MG tablet TAKE 1 TABLET BY MOUTH EVERY DAY 90 Tablet 2    simvastatin (ZOCOR) 20 MG Tab TAKE 1 TABLET BY MOUTH EVERY EVENING 90 Tablet 3    Multiple Vitamins-Minerals (ECHINACEA ACZ PO) Take  by mouth.      LECITHIN PO Take  by mouth.      aspirin (ASA) 81 MG Chew Tab chewable tablet Take 81 mg by mouth every day.      Ibuprofen (MOTRIN IB PO) Take  by mouth.      HYDROcodone/acetaminophen (NORCO)  MG Tab TAKE 1/2 TABLET BY MOUTH EVERY 4 HOURS FOR 5 DAYS AS NEEDED (Patient not taking: Reported on 10/23/2023)      Apoaequorin 10 MG Cap Take  by mouth. (Patient not taking: Reported on 10/23/2023)      budesonide-formoterol (SYMBICORT) 160-4.5 MCG/ACT Aerosol Inhale 2 Puffs 2 times a day. (Patient not taking: Reported on 10/11/2022) 3 Each 3    gabapentin (NEURONTIN) 300 MG Cap Take 1 Capsule by mouth 3  times a day. (Patient not taking: Reported on 10/11/2022) 90 Capsule 3    simvastatin (ZOCOR) 20 MG Tab TAKE 1 TABLET BY MOUTH EVERY EVENING (Patient not taking: Reported on 10/23/2023) 100 Tablet 2    VITAMIN E PO N/A (Patient not taking: Reported on 10/11/2022)      Ascorbic Acid (VITAMIN C PO) Take  by mouth. (Patient not taking: Reported on 10/23/2023)      Ferrous Sulfate (IRON PO) Take  by mouth. (Patient not taking: Reported on 10/23/2023)      Coenzyme Q10 (COQ-10 PO) Take  by mouth. (Patient not taking: Reported on 10/23/2023)      Calcium Carbonate-Vitamin D (CALCIUM 500/D PO) Take  by mouth. (Patient not taking: Reported on 10/23/2023)      B Complex Vitamins (B COMPLEX 100 PO) Place  under the tongue. (Patient not taking: Reported on 10/23/2023)      Fexofenadine HCl (ALLEGRA ALLERGY PO) Take  by mouth. (Patient not taking: Reported on 10/23/2023)      vitamin e (VITAMIN E) 400 UNIT Cap Take 400 Units by mouth every day. (Patient not taking: Reported on 10/23/2023)      multivitamin (THERAGRAN) Tab Take 1 Tab by mouth every day. (Patient not taking: Reported on 10/23/2023)      Cholecalciferol (VITAMIN D-3 PO) Take  by mouth. (Patient not taking: Reported on 10/23/2023)      Iron Tab Take  by mouth. (Patient not taking: Reported on 10/11/2022)      Omega-3 Fatty Acids (FISH OIL) 1000 MG Cap capsule Take 1,000 mg by mouth 3 times a day, with meals. (Patient not taking: Reported on 10/23/2023)      Ginkgo Biloba 40 MG Tab Take  by mouth. (Patient not taking: Reported on 10/23/2023)      Acetaminophen (TYLENOL 8 HOUR PO) Take  by mouth. (Patient not taking: Reported on 10/23/2023)       No current facility-administered medications for this visit.          Current supplements as per medication list.     Allergies: Penicillins, Food, Pineapple, Shellfish allergy, Strawberry, and Sulfa drugs    Current social contact/activities:      She  reports that she quit smoking about 56 years ago. Her smoking use  included cigarettes. She started smoking about 58 years ago. She has a 2.0 pack-year smoking history. She has never used smokeless tobacco. She reports that she does not drink alcohol and does not use drugs.  Counseling given: Not Answered      ROS:    Gait: Uses no assistive device  Ostomy: No  Other tubes: No  Amputations: No  Chronic oxygen use: No  Last eye exam: 2022  Wears hearing aids: No   : Denies any urinary leakage during the last 6 months    Screening:    Depression Screening  Little interest or pleasure in doing things?  0 - not at all  Feeling down, depressed , or hopeless? 0 - not at all  Patient Health Questionnaire Score: 0     If depressive symptoms identified deferred to follow up visit unless specifically addressed in assessment and plan.    Interpretation of PHQ-9 Total Score   Score Severity   1-4 No Depression   5-9 Mild Depression   10-14 Moderate Depression   15-19 Moderately Severe Depression   20-27 Severe Depression    Screening for Cognitive Impairment  Do you or any of your friends or family members have any concern about your memory? No  Three Minute Recall (Banana, Sunrise, Chair) 3/3    Festus clock face with all 12 numbers and set the hands to show 20 past 8.  Yes    Cognitive concerns identified deferred for follow up unless specifically addressed in assessment and plan.    Fall Risk Assessment  Has the patient had two or more falls in the last year or any fall with injury in the last year?  Yes    Safety Assessment  Do you always wear your seatbelt?  Yes  Any changes to home needed to function safely? No  Difficulty hearing.  No  Patient counseled about all safety risks that were identified.    Functional Assessment ADLs  Are there any barriers preventing you from cooking for yourself or meeting nutritional needs?  No.    Are there any barriers preventing you from driving safely or obtaining transportation?  No.    Are there any barriers preventing you from using a telephone or  calling for help?  No    Are there any barriers preventing you from shopping?  No.    Are there any barriers preventing you from taking care of your own finances?  No    Are there any barriers preventing you from managing your medications?  No    Are there any barriers preventing you from showering, bathing or dressing yourself? No    Are there any barriers preventing you from doing housework or laundry? No  Are there any barriers preventing you from using the toilet?No  Are you currently engaging in any exercise or physical activity?  Yes.      Self-Assessment of Health  What is your perception of your health? Excellent  Do you sleep more than six hours a night? Yes  In the past 7 days, how much did pain keep you from doing your normal work? Some  Do you spend quality time with family or friends (virtually or in person)? Yes  Do you usually eat a heart healthy diet that constists of a variety of fruits, vegetables, whole grains and fiber? Yes  Do you eat foods high in fat and/or Fast Food more than three times per week? No    Advance Care Planning  Do you have an Advance Directive, Living Will, Durable Power of , or POLST? Yes  Advance Directive Living Will Durable Power of  POLST is not on file - instructed patient to bring in a copy to scan into their chart      Health Maintenance Summary            Overdue - Hepatitis C Screening (Once) Overdue - never done      No completion history exists for this topic.              Overdue - Zoster (Shingles) Vaccines (3 of 3) Overdue since 11/16/2020 09/21/2020  Imm Admin: Zoster Vaccine Recombinant (RZV) (SHINGRIX)    06/28/2012  Imm Admin: Zoster Vaccine Live (ZVL) (Zostavax) - HISTORICAL DATA              Overdue - COVID-19 Vaccine (4 - Moderna series) Overdue since 1/19/2022 11/24/2021  Imm Admin: MODERNA SARS-COV-2 VACCINE (12+)    03/24/2021  Imm Admin: MODERNA SARS-COV-2 VACCINE (12+)    02/24/2021  Imm Admin: MODERNA SARS-COV-2 VACCINE  (12+)              Overdue - Influenza Vaccine (1) Order placed this encounter      10/11/2022  Imm Admin: Influenza Vaccine Adult HD    10/04/2021  Imm Admin: Influenza Vaccine Adult HD    09/21/2020  Imm Admin: Influenza Vaccine Adult HD    10/10/2019  Imm Admin: Influenza Vaccine Adult HD    09/13/2018  Imm Admin: Influenza Vaccine Adult HD    Only the first 5 history entries have been loaded, but more history exists.              Bone Density Scan (Every 5 Years) Tentatively due on 1/18/2027 01/18/2022  DS-BONE DENSITY STUDY (DEXA)    01/18/2022  DS-BONE DENSITY STUDY (DEXA)    10/26/2015  DS-BONE DENSITY STUDY (DEXA)    05/01/2009  Reason not specified              IMM DTaP/Tdap/Td Vaccine (2 - Td or Tdap) Next due on 9/12/2027 09/12/2017  Imm Admin: Tdap Vaccine              Pneumococcal Vaccine: 65+ Years (Series Information) Completed      09/13/2018  Imm Admin: Pneumococcal polysaccharide vaccine (PPSV-23)    09/12/2017  Imm Admin: Pneumococcal Conjugate Vaccine (Prevnar/PCV-13)    06/29/2015  Imm Admin: Pneumococcal Conjugate Vaccine (Prevnar/PCV-13)              Hepatitis A Vaccine (Hep A) (Series Information) Aged Out      No completion history exists for this topic.              Hepatitis B Vaccine (Hep B) (Series Information) Aged Out      No completion history exists for this topic.              HPV Vaccines (Series Information) Aged Out      No completion history exists for this topic.              Polio Vaccine (Inactivated Polio) (Series Information) Aged Out      No completion history exists for this topic.              Meningococcal Immunization (Series Information) Aged Out      No completion history exists for this topic.              Discontinued - Mammogram  Discontinued        Frequency changed to Never automatically (Topic No Longer Applies)    02/03/2023  MA-DIAGNOSTIC MAMMO BILAT W/TOMOSYNTHESIS W/CAD    02/03/2023  [id number]    12/29/2021  MA-SCREENING MAMMO BILAT  W/TOMOSYNTHESIS W/CAD    2021  [id number]    Only the first 5 history entries have been loaded, but more history exists.              Discontinued - Cervical Cancer Screening  Discontinued        Frequency changed to Never automatically (Topic No Longer Applies)    2018  Done    2018  THINPREP PAP W/HPV AND CTNG    2018  PATHOLOGY GYN SPECIMEN              Discontinued - Annual Wellness Visit  Discontinued        Frequency changed to Never automatically (Topic No Longer Applies)    10/11/2022  Visit Dx: Medicare annual wellness visit, subsequent    10/04/2021  Done    10/04/2021  Visit Dx: Medicare annual wellness visit, subsequent    2020  Visit Dx: Medicare annual wellness visit, subsequent    Only the first 5 history entries have been loaded, but more history exists.              Discontinued - Colorectal Cancer Screening  Discontinued        Frequency changed to Never automatically (Topic No Longer Applies)    11/10/2022  OCCULT BLOOD FECES IMMUNOASSAY (FIT)    10/18/2012  Colonoscopy (Done)                    Patient Care Team:  Slava Worthy M.D. as PCP - General (Family Medicine)  Fadi Najjar, M.D. as Consulting Physician (Nephrology)        Social History     Tobacco Use    Smoking status: Former     Current packs/day: 0.00     Average packs/day: 1 pack/day for 2.0 years (2.0 ttl pk-yrs)     Types: Cigarettes     Start date: 1965     Quit date: 1967     Years since quittin.8    Smokeless tobacco: Never   Substance Use Topics    Alcohol use: No    Drug use: No     Family History   Problem Relation Age of Onset    Lung Disease Mother     Diabetes Mother     Cancer Mother     Heart Disease Father     Heart Disease Maternal Grandmother     No Known Problems Maternal Grandfather     No Known Problems Paternal Grandmother     No Known Problems Paternal Grandfather      She  has a past medical history of Back pain, GERD (gastroesophageal reflux disease), and  "Hyperlipidemia.   No past surgical history on file.    Exam:   /64 (BP Location: Right arm, Patient Position: Sitting, BP Cuff Size: Adult)   Pulse 71   Temp 36.8 °C (98.3 °F) (Temporal)   Resp 16   Ht 1.626 m (5' 4\")   Wt 61.7 kg (136 lb)   SpO2 96%  Body mass index is 23.34 kg/m².    Hearing good.    Dentition good  Alert, oriented in no acute distress.  Eye contact is good, speech goal directed, affect calm    Assessment and Plan. The following treatment and monitoring plan is recommended:    1. Medicare annual wellness visit, subsequent      2. Need for vaccination    - Influenza Vaccine, High Dose (65+ Only)    3. Mixed hyperlipidemia  Currently treated for HLD, taking meds with no new myalgias or joint pain, watching fats in diet  controlled      Past Medical History:   Diagnosis Date    Back pain     L4-5 disc that occasionally hurts    GERD (gastroesophageal reflux disease)     Hyperlipidemia      No past surgical history on file.    Social History     Tobacco Use    Smoking status: Former     Current packs/day: 0.00     Average packs/day: 1 pack/day for 2.0 years (2.0 ttl pk-yrs)     Types: Cigarettes     Start date: 1965     Quit date: 1967     Years since quittin.8    Smokeless tobacco: Never   Substance Use Topics    Alcohol use: No    Drug use: No       Family History   Problem Relation Age of Onset    Lung Disease Mother     Diabetes Mother     Cancer Mother     Heart Disease Father     Heart Disease Maternal Grandmother     No Known Problems Maternal Grandfather     No Known Problems Paternal Grandmother     No Known Problems Paternal Grandfather          Current Outpatient Medications:     acetaminophen (TYLENOL) 325 MG Tab, Take 325 mg by mouth., Disp: , Rfl:     Vitamin E 100 units Tab, Take  by mouth., Disp: , Rfl:     Apoaequorin (PREVAGEN) 10 MG Cap, Take 10 mg by mouth., Disp: , Rfl:     fexofenadine (ALLEGRA) 60 MG Tab, Take  by mouth every day., Disp: , Rfl:     Ginkgo " Biloba 40 MG Tab, Take 40 mg by mouth., Disp: , Rfl:     Ferrous Sulfate 50 MG Tab CR, Take 1 Tablet by mouth every day., Disp: , Rfl:     cyanocobalamin 100 MCG tablet, Take 100 mcg by mouth every day., Disp: , Rfl:     Calcium Carbonate 1500 (600 Ca) MG Tab, Take 1 Tablet by mouth 2 times a day., Disp: , Rfl:     Omega-3 Fatty Acids (FISH OIL) 1000 MG Cap capsule, Take 1 Tablet by mouth 2 times a day., Disp: , Rfl:     ASCORBIC ACID PO, Take  by mouth., Disp: , Rfl:     COENZYME Q10 PO, Take  by mouth., Disp: , Rfl:     Multiple Vitamins-Minerals (OCUVITE ADULT 50+ PO), Take  by mouth., Disp: , Rfl:     rabeprazole (ACIPHEX) 20 MG tablet, TAKE 1 TABLET BY MOUTH EVERY DAY, Disp: 90 Tablet, Rfl: 2    simvastatin (ZOCOR) 20 MG Tab, TAKE 1 TABLET BY MOUTH EVERY EVENING, Disp: 90 Tablet, Rfl: 3    Multiple Vitamins-Minerals (ECHINACEA ACZ PO), Take  by mouth., Disp: , Rfl:     LECITHIN PO, Take  by mouth., Disp: , Rfl:     aspirin (ASA) 81 MG Chew Tab chewable tablet, Take 81 mg by mouth every day., Disp: , Rfl:     Ibuprofen (MOTRIN IB PO), Take  by mouth., Disp: , Rfl:     HYDROcodone/acetaminophen (NORCO)  MG Tab, TAKE 1/2 TABLET BY MOUTH EVERY 4 HOURS FOR 5 DAYS AS NEEDED (Patient not taking: Reported on 10/23/2023), Disp: , Rfl:     Apoaequorin 10 MG Cap, Take  by mouth. (Patient not taking: Reported on 10/23/2023), Disp: , Rfl:     budesonide-formoterol (SYMBICORT) 160-4.5 MCG/ACT Aerosol, Inhale 2 Puffs 2 times a day. (Patient not taking: Reported on 10/11/2022), Disp: 3 Each, Rfl: 3    gabapentin (NEURONTIN) 300 MG Cap, Take 1 Capsule by mouth 3 times a day. (Patient not taking: Reported on 10/11/2022), Disp: 90 Capsule, Rfl: 3    simvastatin (ZOCOR) 20 MG Tab, TAKE 1 TABLET BY MOUTH EVERY EVENING (Patient not taking: Reported on 10/23/2023), Disp: 100 Tablet, Rfl: 2    VITAMIN E PO, N/A (Patient not taking: Reported on 10/11/2022), Disp: , Rfl:     Ascorbic Acid (VITAMIN C PO), Take  by mouth. (Patient  not taking: Reported on 10/23/2023), Disp: , Rfl:     Ferrous Sulfate (IRON PO), Take  by mouth. (Patient not taking: Reported on 10/23/2023), Disp: , Rfl:     Coenzyme Q10 (COQ-10 PO), Take  by mouth. (Patient not taking: Reported on 10/23/2023), Disp: , Rfl:     Calcium Carbonate-Vitamin D (CALCIUM 500/D PO), Take  by mouth. (Patient not taking: Reported on 10/23/2023), Disp: , Rfl:     B Complex Vitamins (B COMPLEX 100 PO), Place  under the tongue. (Patient not taking: Reported on 10/23/2023), Disp: , Rfl:     Fexofenadine HCl (ALLEGRA ALLERGY PO), Take  by mouth. (Patient not taking: Reported on 10/23/2023), Disp: , Rfl:     vitamin e (VITAMIN E) 400 UNIT Cap, Take 400 Units by mouth every day. (Patient not taking: Reported on 10/23/2023), Disp: , Rfl:     multivitamin (THERAGRAN) Tab, Take 1 Tab by mouth every day. (Patient not taking: Reported on 10/23/2023), Disp: , Rfl:     Cholecalciferol (VITAMIN D-3 PO), Take  by mouth. (Patient not taking: Reported on 10/23/2023), Disp: , Rfl:     Iron Tab, Take  by mouth. (Patient not taking: Reported on 10/11/2022), Disp: , Rfl:     Omega-3 Fatty Acids (FISH OIL) 1000 MG Cap capsule, Take 1,000 mg by mouth 3 times a day, with meals. (Patient not taking: Reported on 10/23/2023), Disp: , Rfl:     Ginkgo Biloba 40 MG Tab, Take  by mouth. (Patient not taking: Reported on 10/23/2023), Disp: , Rfl:     Acetaminophen (TYLENOL 8 HOUR PO), Take  by mouth. (Patient not taking: Reported on 10/23/2023), Disp: , Rfl:     Patient was instructed on the use of medications, either prescriptions or OTC and informed on when the appropriate follow up time period should be. In addition, patient was also instructed that should any acute worsening occur that they should notify this clinic asap or call 911.        Services suggested: No services needed at this time  Health Care Screening: Age-appropriate preventive services recommended by USPTF and ACIP covered by Medicare were discussed  today. Services ordered if indicated and agreed upon by the patient.  Referrals offered: Community-based lifestyle interventions to reduce health risks and promote self-management and wellness, fall prevention, nutrition, physical activity, tobacco-use cessation, weight loss, and mental health services as per orders if indicated.    Discussion today about general wellness and lifestyle habits:    Prevent falls and reduce trip hazards; Cautioned about securing or removing rugs.  Have a working fire alarm and carbon monoxide detector;   Engage in regular physical activity and social activities     Follow-up: No follow-ups on file.

## 2024-07-01 DIAGNOSIS — N18.1 STAGE 1 CHRONIC KIDNEY DISEASE: ICD-10-CM

## 2024-07-01 DIAGNOSIS — E78.2 MIXED HYPERLIPIDEMIA: ICD-10-CM

## 2024-07-01 RX ORDER — RABEPRAZOLE SODIUM 20 MG/1
20 TABLET, DELAYED RELEASE ORAL
Qty: 90 TABLET | Refills: 2 | Status: SHIPPED | OUTPATIENT
Start: 2024-07-01

## 2024-07-01 RX ORDER — SIMVASTATIN 20 MG
20 TABLET ORAL NIGHTLY
Qty: 90 TABLET | Refills: 3 | Status: SHIPPED | OUTPATIENT
Start: 2024-07-01

## 2024-10-21 ENCOUNTER — TELEPHONE (OUTPATIENT)
Dept: MEDICAL GROUP | Facility: PHYSICIAN GROUP | Age: 79
End: 2024-10-21
Payer: MEDICARE

## 2024-10-21 DIAGNOSIS — Z23 NEED FOR VACCINATION: ICD-10-CM

## 2024-10-21 DIAGNOSIS — R79.9 ABNORMAL FINDING OF BLOOD CHEMISTRY, UNSPECIFIED: ICD-10-CM

## 2024-10-21 DIAGNOSIS — E78.2 MIXED HYPERLIPIDEMIA: ICD-10-CM

## 2024-10-21 DIAGNOSIS — N18.31 CHRONIC KIDNEY DISEASE, STAGE 3A: ICD-10-CM

## 2024-10-28 ENCOUNTER — OFFICE VISIT (OUTPATIENT)
Dept: MEDICAL GROUP | Facility: PHYSICIAN GROUP | Age: 79
End: 2024-10-28
Payer: MEDICARE

## 2024-10-28 VITALS
DIASTOLIC BLOOD PRESSURE: 70 MMHG | WEIGHT: 139.8 LBS | SYSTOLIC BLOOD PRESSURE: 112 MMHG | RESPIRATION RATE: 20 BRPM | TEMPERATURE: 97.6 F | OXYGEN SATURATION: 96 % | HEART RATE: 66 BPM | BODY MASS INDEX: 23.29 KG/M2 | HEIGHT: 65 IN

## 2024-10-28 DIAGNOSIS — N18.31 CHRONIC KIDNEY DISEASE, STAGE 3A: ICD-10-CM

## 2024-10-28 DIAGNOSIS — Z00.00 MEDICARE ANNUAL WELLNESS VISIT, SUBSEQUENT: ICD-10-CM

## 2024-10-28 DIAGNOSIS — Z23 NEED FOR VACCINATION: ICD-10-CM

## 2024-10-28 DIAGNOSIS — E74.39 GLUCOSE INTOLERANCE: ICD-10-CM

## 2024-10-28 PROCEDURE — G0008 ADMIN INFLUENZA VIRUS VAC: HCPCS | Performed by: FAMILY MEDICINE

## 2024-10-28 PROCEDURE — 90662 IIV NO PRSV INCREASED AG IM: CPT | Performed by: FAMILY MEDICINE

## 2024-10-28 PROCEDURE — G0439 PPPS, SUBSEQ VISIT: HCPCS | Mod: 25 | Performed by: FAMILY MEDICINE

## 2024-10-28 ASSESSMENT — ACTIVITIES OF DAILY LIVING (ADL): BATHING_REQUIRES_ASSISTANCE: 0

## 2024-10-28 ASSESSMENT — PATIENT HEALTH QUESTIONNAIRE - PHQ9: CLINICAL INTERPRETATION OF PHQ2 SCORE: 0

## 2024-10-28 ASSESSMENT — ENCOUNTER SYMPTOMS: GENERAL WELL-BEING: EXCELLENT

## 2025-03-24 DIAGNOSIS — N18.1 STAGE 1 CHRONIC KIDNEY DISEASE: ICD-10-CM

## 2025-03-24 DIAGNOSIS — E78.2 MIXED HYPERLIPIDEMIA: ICD-10-CM

## 2025-03-24 RX ORDER — RABEPRAZOLE SODIUM 20 MG/1
20 TABLET, DELAYED RELEASE ORAL
Qty: 90 TABLET | Refills: 2 | Status: SHIPPED | OUTPATIENT
Start: 2025-03-24